# Patient Record
Sex: FEMALE | Race: BLACK OR AFRICAN AMERICAN | NOT HISPANIC OR LATINO | Employment: FULL TIME | URBAN - METROPOLITAN AREA
[De-identification: names, ages, dates, MRNs, and addresses within clinical notes are randomized per-mention and may not be internally consistent; named-entity substitution may affect disease eponyms.]

---

## 2017-09-30 ENCOUNTER — HOSPITAL ENCOUNTER (EMERGENCY)
Facility: HOSPITAL | Age: 22
Discharge: HOME | End: 2017-09-30
Payer: COMMERCIAL

## 2017-09-30 ENCOUNTER — APPOINTMENT (EMERGENCY)
Dept: RADIOLOGY | Facility: HOSPITAL | Age: 22
End: 2017-09-30
Payer: COMMERCIAL

## 2017-09-30 VITALS
RESPIRATION RATE: 18 BRPM | OXYGEN SATURATION: 98 % | TEMPERATURE: 97.9 F | HEART RATE: 93 BPM | DIASTOLIC BLOOD PRESSURE: 62 MMHG | SYSTOLIC BLOOD PRESSURE: 100 MMHG | WEIGHT: 180 LBS

## 2017-09-30 DIAGNOSIS — R07.89 MUSCULAR CHEST PAIN: ICD-10-CM

## 2017-09-30 DIAGNOSIS — R07.89 CHEST PAIN, NON-CARDIAC: Primary | ICD-10-CM

## 2017-09-30 PROCEDURE — 71020 HB CHEST X-RAY 2VW FRONTAL&LATL: CPT

## 2017-09-30 PROCEDURE — 99285 EMERGENCY DEPT VISIT HI MDM: CPT

## 2017-09-30 PROCEDURE — 93005 ELECTROCARDIOGRAM TRACING: CPT

## 2017-09-30 RX ORDER — ACETAMINOPHEN 325 MG/1
650 TABLET ORAL AS NEEDED
COMMUNITY
End: 2018-08-07

## 2017-09-30 RX ORDER — METHOCARBAMOL 500 MG/1
500 TABLET, FILM COATED ORAL AS NEEDED
COMMUNITY
End: 2018-08-07

## 2017-09-30 RX ORDER — NAPROXEN 500 MG/1
250 TABLET ORAL AS NEEDED
COMMUNITY
End: 2018-08-07

## 2017-09-30 NOTE — DISCHARGE INSTRUCTIONS
- Take ibuprofen 400 mg as needed every 6 -8 hours for pain  Chest Wall Pain, Ambulatory Care   GENERAL INFORMATION:   Chest wall pain  may be caused by problems with the muscles, cartilage, or bones of the chest wall  Chest wall pain may also be caused by pain that spreads to your chest from another part of your body  The pain may be aching, severe, dull, or sharp  It may come and go, or it may be constant  The pain may be worse when you move in certain ways, breathe deeply, or cough  Seek immediate care for the following symptoms:   · Severe pain    · You have any of the following signs of a heart attack:      ¨ Squeezing, pressure, or pain in your chest that lasts longer than 5 minutes or returns    ¨ Discomfort or pain in your back, neck, jaw, stomach, or arm     ¨ Trouble breathing    ¨ Nausea or vomiting    ¨ Lightheadedness or a sudden cold sweat, especially with chest pain or trouble breathing  Treatment  depends on the cause of your chest wall pain  You may need any of the following:  · NSAIDs  help decrease swelling and pain or fever  This medicine is available with or without a doctor's order  NSAIDs can cause stomach bleeding or kidney problems in certain people  If you take blood thinner medicine, always ask your healthcare provider if NSAIDs are safe for you  Always read the medicine label and follow directions  · Acetaminophen  decreases pain  It is available without a doctor's order  Ask how much to take and how often to take it  Follow directions  Acetaminophen can cause liver damage if not taken correctly  · Apply heat  on your chest for 20 to 30 minutes every 2 hours for as many days as directed  Heat helps decrease pain and muscle spasms  · Apply ice  on your chest for 15 to 20 minutes every hour or as directed  Use an ice pack, or put crushed ice in a plastic bag  Cover it with a towel  Ice helps prevent tissue damage and decreases swelling and pain    Follow up with your healthcare provider as directed:  Write down your questions so you remember to ask them during your visits  CARE AGREEMENT:   You have the right to help plan your care  Learn about your health condition and how it may be treated  Discuss treatment options with your caregivers to decide what care you want to receive  You always have the right to refuse treatment  The above information is an  only  It is not intended as medical advice for individual conditions or treatments  Talk to your doctor, nurse or pharmacist before following any medical regimen to see if it is safe and effective for you  © 2014 [address] is for End User's use only and may not be sold, redistributed or otherwise used for commercial purposes  All illustrations and images included in CareNotes® are the copyrighted property of [employer] M [employer]  or Sharath Comer
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
## 2018-05-17 LAB
BILIRUB UR QL STRIP: NEGATIVE MG/DL
CLARITY UR: CLEAR
COLOR UR: YELLOW
COMMENT (HISTORICAL): ABNORMAL
GLUCOSE UR STRIP-MCNC: NEGATIVE MG/DL
HGB UR QL STRIP.AUTO: NEGATIVE
KETONES UR STRIP-MCNC: NEGATIVE MG/DL
LEUKOCYTE ESTERASE UR QL STRIP: 25
NITRITE UR QL STRIP: NEGATIVE
PH UR STRIP.AUTO: 5 [PH] (ref 4.5–8)
PREGNANCY TEST URINE (HISTORICAL): NEGATIVE
PROT UR STRIP-MCNC: NEGATIVE MG/DL
SP GR UR STRIP.AUTO: 1.02 (ref 1–1.04)
UROBILINOGEN UR QL STRIP.AUTO: NEGATIVE MG/DL (ref 0–1)

## 2018-08-07 ENCOUNTER — APPOINTMENT (EMERGENCY)
Dept: ULTRASOUND IMAGING | Facility: HOSPITAL | Age: 23
End: 2018-08-07
Payer: COMMERCIAL

## 2018-08-07 ENCOUNTER — HOSPITAL ENCOUNTER (EMERGENCY)
Facility: HOSPITAL | Age: 23
Discharge: HOME | End: 2018-08-07
Attending: EMERGENCY MEDICINE
Payer: COMMERCIAL

## 2018-08-07 VITALS
WEIGHT: 190 LBS | SYSTOLIC BLOOD PRESSURE: 111 MMHG | HEART RATE: 80 BPM | RESPIRATION RATE: 16 BRPM | OXYGEN SATURATION: 100 % | DIASTOLIC BLOOD PRESSURE: 74 MMHG | BODY MASS INDEX: 31.14 KG/M2 | TEMPERATURE: 98.3 F

## 2018-08-07 DIAGNOSIS — Z34.90 PREGNANCY AT EARLY STAGE: Primary | ICD-10-CM

## 2018-08-07 DIAGNOSIS — R19.7 NAUSEA VOMITING AND DIARRHEA: ICD-10-CM

## 2018-08-07 DIAGNOSIS — R11.2 NAUSEA VOMITING AND DIARRHEA: ICD-10-CM

## 2018-08-07 LAB
ABO GROUP BLD: NORMAL
B-HCG SERPL-ACNC: ABNORMAL MIU/ML
BACTERIA UR QL AUTO: ABNORMAL /HPF
BILIRUB UR QL STRIP: NEGATIVE
BLD GP AB SCN SERPL QL: NEGATIVE
CLARITY UR: CLEAR
COLOR UR: YELLOW
EXT PREG TEST URINE: ABNORMAL
GLUCOSE UR STRIP-MCNC: NEGATIVE MG/DL
HGB UR QL STRIP.AUTO: NEGATIVE
KETONES UR STRIP-MCNC: NEGATIVE MG/DL
LEUKOCYTE ESTERASE UR QL STRIP: ABNORMAL
NITRITE UR QL STRIP: NEGATIVE
NON-SQ EPI CELLS URNS QL MICRO: ABNORMAL /HPF
PH UR STRIP.AUTO: 6.5 [PH] (ref 4.5–8)
PROT UR STRIP-MCNC: NEGATIVE MG/DL
RBC #/AREA URNS AUTO: ABNORMAL /HPF
RH BLD: POSITIVE
SP GR UR STRIP.AUTO: >=1.03 (ref 1–1.03)
SPECIMEN EXPIRATION DATE: NORMAL
UROBILINOGEN UR QL STRIP.AUTO: 1 E.U./DL
WBC #/AREA URNS AUTO: ABNORMAL /HPF

## 2018-08-07 PROCEDURE — 76801 OB US < 14 WKS SINGLE FETUS: CPT

## 2018-08-07 PROCEDURE — 81001 URINALYSIS AUTO W/SCOPE: CPT

## 2018-08-07 PROCEDURE — 81025 URINE PREGNANCY TEST: CPT | Performed by: EMERGENCY MEDICINE

## 2018-08-07 PROCEDURE — 86900 BLOOD TYPING SEROLOGIC ABO: CPT | Performed by: EMERGENCY MEDICINE

## 2018-08-07 PROCEDURE — 86901 BLOOD TYPING SEROLOGIC RH(D): CPT | Performed by: EMERGENCY MEDICINE

## 2018-08-07 PROCEDURE — 86850 RBC ANTIBODY SCREEN: CPT | Performed by: EMERGENCY MEDICINE

## 2018-08-07 PROCEDURE — 99284 EMERGENCY DEPT VISIT MOD MDM: CPT

## 2018-08-07 PROCEDURE — 84702 CHORIONIC GONADOTROPIN TEST: CPT | Performed by: EMERGENCY MEDICINE

## 2018-08-07 PROCEDURE — 36415 COLL VENOUS BLD VENIPUNCTURE: CPT | Performed by: EMERGENCY MEDICINE

## 2018-08-07 RX ORDER — ONDANSETRON 4 MG/1
4 TABLET, ORALLY DISINTEGRATING ORAL EVERY 8 HOURS PRN
Qty: 20 TABLET | Refills: 0 | Status: SHIPPED | OUTPATIENT
Start: 2018-08-07 | End: 2018-08-07

## 2018-08-07 RX ORDER — ONDANSETRON 4 MG/1
4 TABLET, ORALLY DISINTEGRATING ORAL EVERY 6 HOURS PRN
Status: DISCONTINUED | OUTPATIENT
Start: 2018-08-07 | End: 2018-08-07 | Stop reason: HOSPADM

## 2018-08-07 RX ORDER — ONDANSETRON 4 MG/1
4 TABLET, ORALLY DISINTEGRATING ORAL EVERY 8 HOURS PRN
Qty: 20 TABLET | Refills: 0 | Status: SHIPPED | OUTPATIENT
Start: 2018-08-07 | End: 2018-08-15 | Stop reason: SDUPTHER

## 2018-08-07 RX ADMIN — ONDANSETRON 4 MG: 4 TABLET, ORALLY DISINTEGRATING ORAL at 09:42

## 2018-08-07 NOTE — DISCHARGE INSTRUCTIONS
Pregnancy   WHAT YOU NEED TO KNOW:   A normal pregnancy lasts about 40 weeks  The first trimester lasts from your last period through the 12th week of pregnancy  The second trimester lasts from the 13th week of your pregnancy through the 23rd week  The third trimester lasts from your 24th week of pregnancy until your baby is born  If you know the date of your last period, your healthcare provider can estimate your due date  You may give birth to your baby any time from 37 weeks to 2 weeks after your due date  DISCHARGE INSTRUCTIONS:   Return to the emergency department if:   · You develop a severe headache that does not go away  · You have new or increased vision changes, such as blurred or spotted vision  · You have new or increased swelling in your face or hands  · You have pain or cramping in your abdomen or low back  · You have vaginal bleeding  Contact your healthcare provider or obstetrician if:   · You have abdominal cramps, pressure, or tightening  · You have a change in vaginal discharge  · You cannot keep food or drinks down, and you are losing weight  · You have chills or a fever  · You have vaginal itching, burning, or pain  · You have yellow, green, white, or foul-smelling vaginal discharge  · You have pain or burning when you urinate, less urine than usual, or pink or bloody urine  · You have questions or concerns about your condition or care  Medicines:   · Prenatal vitamins  provide some of the extra vitamins and minerals you need during pregnancy  Prenatal vitamins may also help to decrease the risk of certain birth defects  · Take your medicine as directed  Contact your healthcare provider if you think your medicine is not helping or if you have side effects  Tell him or her if you are allergic to any medicine  Keep a list of the medicines, vitamins, and herbs you take  Include the amounts, and when and why you take them   Bring the list or the pill bottles to follow-up visits  Carry your medicine list with you in case of an emergency  Follow up with your healthcare provider or obstetrician as directed:  Go to all of your prenatal visits during your pregnancy  Write down your questions so you remember to ask them during your visits  Body changes that may occur during your pregnancy:   · Breast changes  you will experience include tenderness and tingling during the early part of your pregnancy  Your breasts will become larger  You may need to use a support bra  You may see a thin, yellow fluid, called colostrum, leak from your nipples during the second trimester  Colostrum is a liquid that changes to milk about 3 days after you give birth  · Skin changes and stretch marks  may occur during your pregnancy  You may have red marks, called stretch marks, on your skin  Stretch marks will usually fade after pregnancy  Use lotion if your skin is dry and itchy  The skin on your face, around your nipples, and below your belly button may darken  Most of the time, your skin will return to its normal color after your baby is born  · Morning sickness  is nausea and vomiting that can happen at any time of day  Avoid fatty and spicy foods  Eat small meals throughout the day instead of large meals  Haylie may help to decrease nausea  Ask your healthcare provider about other ways of decreasing nausea and vomiting  · Heartburn  may be caused by changes in your hormones during pregnancy  Your growing uterus may also push your stomach upward and force stomach acid to back up into your esophagus  Eat 4 or 5 small meals each day instead of large meals  Avoid spicy foods  Avoid eating right before bedtime  · Constipation  may develop during your pregnancy  To treat constipation, eat foods high in fiber such as fiber cereals, beans, fruits, vegetables, whole-grain breads, and prune juice  Get regular exercise and drink plenty of water   Your healthcare provider may also suggest a fiber supplement to soften your bowel movements  Talk to your healthcare provider before you use any medicines to decrease constipation  · Hemorrhoids  are enlarged veins in the rectal area  They may cause pain, itching, and bright red bleeding from your rectum  To decrease your risk of hemorrhoids, prevent constipation and do not strain to have a bowel movement  If you have hemorrhoids, soak in a tub of warm water to ease discomfort  Ask your healthcare provider how you can treat hemorrhoids  · Leg cramps and swelling  may be caused by low calcium levels or the added weight of pregnancy  Raise your legs above the level of your heart to decrease swelling  During a leg cramp, stretch or massage the muscle that has the cramp  Heat may help decrease pain and muscle spasms  Apply heat on your muscle for 20 to 30 minutes every 2 hours for as many days as directed  · Back pain  may occur as your baby grows  Do not stand for long periods of time or lift heavy items  Use good posture while you stand, squat, or bend  Wear low-heeled shoes with good support  Rest may also help to relieve back pain  Ask your healthcare provider about exercises you can do to strengthen your back muscles  Stay healthy during your pregnancy:   · Eat a variety of healthy foods  Healthy foods include fruits, vegetables, whole-grain breads, low-fat dairy foods, beans, lean meats, and fish  Drink liquids as directed  Ask how much liquid to drink each day and which liquids are best for you  Limit caffeine to less than 200 milligrams each day  Limit your intake of fish to 2 servings each week  Choose fish low in mercury such as canned light tuna, shrimp, crab, salmon, cod, or tilapia  Do not  eat fish high in mercury such as swordfish, tilefish, raeann mackerel, and shark  · Take prenatal vitamins as directed  Your need for certain vitamins and minerals, such as folic acid, increases during pregnancy   Prenatal vitamins provide some of the extra vitamins and minerals you need  Prenatal vitamins may also help to decrease the risk of certain birth defects  · Ask how much weight you should gain during your pregnancy  Too much or too little weight gain can be unhealthy for you and your baby  · Talk to your healthcare provider about exercise  Moderate exercise can help you stay fit  Your healthcare provider will help you plan an exercise program that is safe for you during pregnancy  · Do not smoke  If you smoke, it is never too late to quit  Smoking increases your risk of a miscarriage and other health problems during your pregnancy  Smoking can cause your baby to be born too early or weigh less at birth  Ask your healthcare provider for information if you need help quitting  · Do not drink alcohol  Alcohol passes from your body to your baby through the placenta  It can affect your baby's brain development and cause fetal alcohol syndrome (FAS)  FAS is a group of conditions that causes mental, behavior, and growth problems  · Talk to your healthcare provider before you take any medicines  Many medicines may harm your baby if you take them when you are pregnant  Do not take any medicines, vitamins, herbs, or supplements without first talking to your healthcare provider  Never use illegal or street drugs (such as marijuana or cocaine) while you are pregnant  Safety tips:   · Avoid hot tubs and saunas  Do not use a hot tub or sauna while you are pregnant, especially during your first trimester  Hot tubs and saunas may raise your baby's temperature and increase the risk of birth defects  · Avoid toxoplasmosis  This is an infection caused by eating raw meat or being around infected cat feces  It can cause birth defects, miscarriages, and other problems  Wash your hands after you touch raw meat  Make sure any meat is well-cooked before you eat it  Avoid raw eggs and unpasteurized milk   Use gloves or ask someone else to clean your cat's litter box while you are pregnant  · Ask your healthcare provider about travel  The most comfortable time to travel is during the second trimester  Ask your healthcare provider if you can travel after 36 weeks  You may not be able to travel in an airplane after 36 weeks  He may also recommend that you avoid long road trips  © 2017 [address] Information is for End User's use only and may not be sold, redistributed or otherwise used for commercial purposes  All illustrations and images included in CareNotes® are the copyrighted property of [employer]  or Sharath Comer  The above information is an  only  It is not intended as medical advice for individual conditions or treatments  Talk to your doctor, nurse or pharmacist before following any medical regimen to see if it is safe and effective for you

## 2018-08-07 NOTE — ED PROVIDER NOTES
History  Chief Complaint   Patient presents with    Diarrhea     Reports for last two weeks, whenever she eats she has diarrhea  Just found out she is pregnant  Also has been vomiting  20-year-old female presents for nausea vomiting diarrhea  Recently found out last month that she was pregnant  For the past two weeks she has had watery diarrhea with no blood  Vomiting without bilious contents or blood  Decreased appetite  symptoms seem to be worse in the morning  Patient does have some crampy abdominal pain intermittently  No vaginal bleeding or discharge No dysuria  No other associated symptoms  No prenatal care thus far  Assessment plan:  Nausea vomiting diarrhea  Likely related to pregnancy  Check urine beta hCG  Will require ultrasound to confirm IUP  Zofran for nausea            None       History reviewed  No pertinent past medical history  History reviewed  No pertinent surgical history  History reviewed  No pertinent family history  I have reviewed and agree with the history as documented  Social History   Substance Use Topics    Smoking status: Never Smoker    Smokeless tobacco: Never Used    Alcohol use No        Review of Systems   Constitutional: Negative for chills, fatigue and fever  Eyes: Negative for photophobia and visual disturbance  Respiratory: Negative for cough and shortness of breath  Cardiovascular: Negative for chest pain, palpitations and leg swelling  Gastrointestinal: Positive for abdominal pain, diarrhea and nausea  Negative for vomiting  Endocrine: Negative for polydipsia and polyuria  Genitourinary: Negative for decreased urine volume, difficulty urinating, dysuria and frequency  Musculoskeletal: Negative for back pain, neck pain and neck stiffness  Skin: Negative for color change and rash  Allergic/Immunologic: Negative for environmental allergies and immunocompromised state     Neurological: Negative for dizziness and headaches  Hematological: Negative for adenopathy  Does not bruise/bleed easily  Psychiatric/Behavioral: Negative for dysphoric mood  The patient is not nervous/anxious  Physical Exam  Physical Exam   Constitutional: She is oriented to person, place, and time  She appears well-developed and well-nourished  No distress  HENT:   Head: Normocephalic and atraumatic  Nose: Nose normal    Eyes: Conjunctivae and EOM are normal  Pupils are equal, round, and reactive to light  No scleral icterus  Neck: Normal range of motion  Neck supple  No JVD present  No tracheal deviation present  No thyromegaly present  Cardiovascular: Normal rate, regular rhythm, normal heart sounds and intact distal pulses  Exam reveals no gallop and no friction rub  Pulmonary/Chest: Effort normal and breath sounds normal  No respiratory distress  She has no wheezes  She has no rales  She exhibits no tenderness  Abdominal: Soft  Bowel sounds are normal  She exhibits no distension and no mass  There is tenderness (suprapubic)  There is no rebound and no guarding  No hernia  Musculoskeletal: Normal range of motion  She exhibits no edema, tenderness or deformity  Neurological: She is alert and oriented to person, place, and time  She has normal reflexes  No cranial nerve deficit  Coordination normal    Skin: Skin is warm and dry  She is not diaphoretic  No erythema  Psychiatric: She has a normal mood and affect  Her behavior is normal    Nursing note and vitals reviewed        Vital Signs  ED Triage Vitals [08/07/18 0928]   Temperature Pulse Respirations Blood Pressure SpO2   98 3 °F (36 8 °C) 94 16 113/66 98 %      Temp Source Heart Rate Source Patient Position - Orthostatic VS BP Location FiO2 (%)   Oral Monitor Sitting Right arm --      Pain Score       7           Vitals:    08/07/18 0928 08/07/18 1119   BP: 113/66 111/74   Pulse: 94 80   Patient Position - Orthostatic VS: Sitting Sitting       Visual Acuity      ED Medications  Medications - No data to display    Diagnostic Studies  Results Reviewed     Procedure Component Value Units Date/Time    Quantitative hCG [[id number]]  (Abnormal) Collected:  08/07/18 0939    Lab Status:  Final result Specimen:  Blood from Arm, Left Updated:  08/07/18 1057     HCG, Quant 66,725 1 (H) mIU/mL     Narrative:          Expected Ranges:     Approximate               Approximate HCG  Gestation age          Concentration ( mIU/mL)  _____________          ______________________   Jalyn Pandya                      HCG values  0 2-1                       5-50  1-2                           2-3                         100-5000  3-4                         500-59037  4-5                         1000-94256  5-6                         [id number]  6-8                         [id number]  8-12                        [id number]    Urine Microscopic [[id number]]  (Abnormal) Collected:  08/07/18 1013    Lab Status:  Final result Specimen:  Urine from Urine, Clean Catch Updated:  08/07/18 1042     RBC, UA None Seen /hpf      WBC, UA 1-2 (A) /hpf      Epithelial Cells Occasional /hpf      Bacteria, UA Occasional /hpf     POCT pregnancy, urine [[id number]]  (Abnormal) Resulted:  08/07/18 1007    Lab Status:  Final result Updated:  08/07/18 1007     EXT PREG TEST UR (Ref: Negative) POSS ( + )    POCT urinalysis dipstick [[id number]]  (Abnormal) Resulted:  08/07/18 1007    Lab Status:  Final result Specimen:  Urine Updated:  08/07/18 1007    ED Urine Macroscopic [[id number]]  (Abnormal) Collected:  08/07/18 1013    Lab Status:  Final result Specimen:  Urine Updated:  08/07/18 1005     Color, UA Yellow     Clarity, UA Clear     pH, UA 6 5     Leukocytes, UA Trace (A)     Nitrite, UA Negative     Protein, UA Negative mg/dl      Glucose, UA Negative mg/dl      Ketones, UA Negative mg/dl      Urobilinogen, UA 1 0 E U /dl      Bilirubin, UA Negative     Blood, UA Negative     Specific Gravity, UA >=1 030    Narrative: CLINITEK RESULT                 US OB < 14 weeks with transvaginal   Final Result by Srinath Posey DO (08/07 1129)   Single live intrauterine gestation at 7 weeks 2 days (range +/- 4 days)  MÓNICA of March 24, 2019  Workstation performed: [id number]                    Procedures  Procedures       Phone Contacts  ED Phone Contact    ED Course                               MDM  Number of Diagnoses or Management Options  Nausea vomiting and diarrhea: new and requires workup  Pregnancy at early stage: new and requires workup  Diagnosis management comments: IUP with subchroinic hemorrhage  Pt has no bleeding vaginally, has appt with obgyn next week  Amount and/or Complexity of Data Reviewed  Clinical lab tests: reviewed and ordered  Tests in the radiology section of CPT®: ordered and reviewed  Tests in the medicine section of CPT®: reviewed and ordered  Independent visualization of images, tracings, or specimens: yes      CritCare Time    Disposition  Final diagnoses:   Pregnancy at early stage   Nausea vomiting and diarrhea     Time reflects when diagnosis was documented in both MDM as applicable and the Disposition within this note     Time User Action Codes Description Comment    8/7/2018 11:34 AM Alea Mcdowell Add [Z34 90] Pregnancy at early stage     8/7/2018  1:33 PM Alea Mcdowell Add [R11 2,  R19 7] Nausea vomiting and diarrhea       ED Disposition     ED Disposition Condition Comment    Discharge  Tushar Leblanc discharge to home/self care      Condition at discharge: Good        Follow-up Information     Follow up With Specialties [facility]   [place] [id number] [phone]          Discharge Medication List as of 8/7/2018 11:48 AM      START taking these medications    Details   ondansetron (ZOFRAN-ODT) 4 mg disintegrating tablet Take 1 tablet (4 mg total) by mouth every 8 (eight) hours as needed for nausea or vomiting for up to 7 days, Starting Tue 8/7/2018, Until Tue 8/14/2018, Print           No discharge procedures on file      ED Provider  Electronically Signed by           Kemar Ulloa DO  08/07/18 7537

## 2018-08-15 ENCOUNTER — OFFICE VISIT (OUTPATIENT)
Dept: OBGYN CLINIC | Facility: CLINIC | Age: 23
End: 2018-08-15
Payer: COMMERCIAL

## 2018-08-15 VITALS
BODY MASS INDEX: 30.92 KG/M2 | DIASTOLIC BLOOD PRESSURE: 76 MMHG | HEIGHT: 67 IN | WEIGHT: 197 LBS | SYSTOLIC BLOOD PRESSURE: 117 MMHG

## 2018-08-15 DIAGNOSIS — Z34.90 PREGNANCY AT EARLY STAGE: ICD-10-CM

## 2018-08-15 DIAGNOSIS — Z33.1 ENCOUNTER FOR INCIDENTAL PREGNANCY: Primary | ICD-10-CM

## 2018-08-15 DIAGNOSIS — N91.2 AMENORRHEA: ICD-10-CM

## 2018-08-15 LAB — SL AMB POCT URINE HCG: POSITIVE

## 2018-08-15 PROCEDURE — 81025 URINE PREGNANCY TEST: CPT | Performed by: OBSTETRICS & GYNECOLOGY

## 2018-08-15 PROCEDURE — 99203 OFFICE O/P NEW LOW 30 MIN: CPT | Performed by: OBSTETRICS & GYNECOLOGY

## 2018-08-15 RX ORDER — ONDANSETRON 4 MG/1
4 TABLET, ORALLY DISINTEGRATING ORAL EVERY 6 HOURS SCHEDULED
Qty: 20 TABLET | Refills: 3 | Status: SHIPPED | OUTPATIENT
Start: 2018-08-15 | End: 2018-10-31

## 2018-08-15 NOTE — PROGRESS NOTES
Assessment/Plan:   Here for pregnancy confirmation   Diagnoses and all orders for this visit:    Encounter for incidental pregnancy  -     Obstetric panel; Future  -     hCG, quantitative; Future  -     TSH, 3rd generation with Free T4 reflex; Future  -     US OB < 14 weeks single or first gestation level 1; Future  -     Progesterone; Future    Amenorrhea  -     POCT urine HCG    Pregnancy at early stage  -     ondansetron (ZOFRAN-ODT) 4 mg disintegrating tablet; Take 1 tablet (4 mg total) by mouth every 6 (six) hours for 7 days        Subjective:  Pregnancy confirmation     Patient ID: Leeanne Howard is a 25 y o  female  HPI   60-year-old female  4 para 2012 positive home pregnancy test confirmed added emergency room visit for diarrhea last week FD LMP was 2018 estimated gestational age is approximately 8 weeks and 4 days EDC is 2019  Patient does have some ongoing nausea which she uses Zofran  She has also had some loose stools and occasional diarrhea  Recommended Imodium  Her 1st baby delivered vaginal when she was 13years old, he weighed 9 lb  Her 2nd baby was a female which weighed 8 lb  She is approximately 8 weeks today  Screening laboratory studies prenatal panel will be drawn follow-up ultrasound has been ordered will schedule nurse interview and physical exam within the next 2 weeks  Patient oriented to office procedures and practices all questions answered  Review of Systems   All other systems reviewed and are negative  Objective:  No acute distress     Physical Exam   Constitutional: She is oriented to person, place, and time  She appears well-developed and well-nourished  HENT:   Head: Normocephalic  Eyes: Pupils are equal, round, and reactive to light  Neck: Normal range of motion  Genitourinary: No vaginal discharge found  Genitourinary Comments: Pelvic exam deferred   Neurological: She is alert and oriented to person, place, and time     Skin: Skin is warm and dry  Psychiatric: She has a normal mood and affect   Her behavior is normal  Judgment and thought content normal

## 2018-08-23 ENCOUNTER — INITIAL PRENATAL (OUTPATIENT)
Dept: OBGYN CLINIC | Facility: CLINIC | Age: 23
End: 2018-08-23
Payer: COMMERCIAL

## 2018-08-23 ENCOUNTER — ULTRASOUND (OUTPATIENT)
Dept: OBGYN CLINIC | Facility: CLINIC | Age: 23
End: 2018-08-23
Payer: COMMERCIAL

## 2018-08-23 DIAGNOSIS — Z36.9 ANTENATAL SCREENING ENCOUNTER: Primary | ICD-10-CM

## 2018-08-23 DIAGNOSIS — Z3A.09 9 WEEKS GESTATION OF PREGNANCY: Primary | ICD-10-CM

## 2018-08-23 LAB
ABO GROUP BLD: NORMAL
BACTERIA UR QL AUTO: ABNORMAL /HPF
BASOPHILS # BLD AUTO: 0.04 THOUSANDS/ΜL (ref 0–0.1)
BASOPHILS NFR BLD AUTO: 0 % (ref 0–1)
BILIRUB UR QL STRIP: NEGATIVE
BLD GP AB SCN SERPL QL: NEGATIVE
CLARITY UR: CLEAR
COLOR UR: YELLOW
EOSINOPHIL # BLD AUTO: 0.06 THOUSAND/ΜL (ref 0–0.61)
EOSINOPHIL NFR BLD AUTO: 1 % (ref 0–6)
ERYTHROCYTE [DISTWIDTH] IN BLOOD BY AUTOMATED COUNT: 14.5 % (ref 11.6–15.1)
GLUCOSE UR STRIP-MCNC: NEGATIVE MG/DL
HCT VFR BLD AUTO: 41.2 % (ref 34.8–46.1)
HGB BLD-MCNC: 12.9 G/DL (ref 11.5–15.4)
HGB UR QL STRIP.AUTO: NEGATIVE
HYALINE CASTS #/AREA URNS LPF: ABNORMAL /LPF
IMM GRANULOCYTES # BLD AUTO: 0.03 THOUSAND/UL (ref 0–0.2)
IMM GRANULOCYTES NFR BLD AUTO: 0 % (ref 0–2)
KETONES UR STRIP-MCNC: NEGATIVE MG/DL
LEUKOCYTE ESTERASE UR QL STRIP: NEGATIVE
LYMPHOCYTES # BLD AUTO: 1.71 THOUSANDS/ΜL (ref 0.6–4.47)
LYMPHOCYTES NFR BLD AUTO: 19 % (ref 14–44)
MCH RBC QN AUTO: 26.1 PG (ref 26.8–34.3)
MCHC RBC AUTO-ENTMCNC: 31.3 G/DL (ref 31.4–37.4)
MCV RBC AUTO: 83 FL (ref 82–98)
MONOCYTES # BLD AUTO: 0.59 THOUSAND/ΜL (ref 0.17–1.22)
MONOCYTES NFR BLD AUTO: 7 % (ref 4–12)
NEUTROPHILS # BLD AUTO: 6.66 THOUSANDS/ΜL (ref 1.85–7.62)
NEUTS SEG NFR BLD AUTO: 73 % (ref 43–75)
NITRITE UR QL STRIP: NEGATIVE
NON-SQ EPI CELLS URNS QL MICRO: ABNORMAL /HPF
NRBC BLD AUTO-RTO: 0 /100 WBCS
PH UR STRIP.AUTO: 7.5 [PH] (ref 4.5–8)
PLATELET # BLD AUTO: 357 THOUSANDS/UL (ref 149–390)
PMV BLD AUTO: 10.9 FL (ref 8.9–12.7)
PROT UR STRIP-MCNC: ABNORMAL MG/DL
RBC # BLD AUTO: 4.94 MILLION/UL (ref 3.81–5.12)
RBC #/AREA URNS AUTO: ABNORMAL /HPF
RH BLD: POSITIVE
SP GR UR STRIP.AUTO: 1.02 (ref 1–1.03)
SPECIMEN EXPIRATION DATE: NORMAL
UROBILINOGEN UR QL STRIP.AUTO: 1 E.U./DL
WBC # BLD AUTO: 9.09 THOUSAND/UL (ref 4.31–10.16)
WBC #/AREA URNS AUTO: ABNORMAL /HPF

## 2018-08-23 PROCEDURE — OBC

## 2018-08-23 PROCEDURE — 80081 OBSTETRIC PANEL INC HIV TSTG: CPT | Performed by: OBSTETRICS & GYNECOLOGY

## 2018-08-23 PROCEDURE — 76817 TRANSVAGINAL US OBSTETRIC: CPT

## 2018-08-23 PROCEDURE — 36415 COLL VENOUS BLD VENIPUNCTURE: CPT

## 2018-08-23 PROCEDURE — 87086 URINE CULTURE/COLONY COUNT: CPT | Performed by: OBSTETRICS & GYNECOLOGY

## 2018-08-23 PROCEDURE — 81001 URINALYSIS AUTO W/SCOPE: CPT

## 2018-08-23 NOTE — PROGRESS NOTES
New OB counseling, Baby and Me reviewed, Pregnancy Essentials booklet given  Pt had prenatal labs drawn in office today  Pt was given referral for MFM and appointments were made  Pt denies any problems or concerns at this time

## 2018-08-24 LAB
BACTERIA UR CULT: NORMAL
HBV SURFACE AG SER QL: NORMAL
RPR SER QL: NORMAL
RUBV IGG SERPL IA-ACNC: 58.3 IU/ML

## 2018-08-25 LAB — HIV 1+2 AB+HIV1 P24 AG SERPL QL IA: NORMAL

## 2018-09-05 ENCOUNTER — TELEPHONE (OUTPATIENT)
Dept: OBGYN CLINIC | Facility: CLINIC | Age: 23
End: 2018-09-05

## 2018-09-05 NOTE — TELEPHONE ENCOUNTER
----- Message from Lida Soliman MD sent at 9/4/2018  7:53 AM EDT -----  Regarding: No show  Please call pt to reschedule appt

## 2018-09-10 ENCOUNTER — ROUTINE PRENATAL (OUTPATIENT)
Dept: PERINATAL CARE | Facility: CLINIC | Age: 23
End: 2018-09-10
Payer: COMMERCIAL

## 2018-09-10 VITALS — DIASTOLIC BLOOD PRESSURE: 51 MMHG | HEART RATE: 90 BPM | SYSTOLIC BLOOD PRESSURE: 86 MMHG | HEIGHT: 67 IN

## 2018-09-10 DIAGNOSIS — Z36.82 ENCOUNTER FOR NUCHAL TRANSLUCENCY TESTING: Primary | ICD-10-CM

## 2018-09-10 DIAGNOSIS — Z36.9 ANTENATAL SCREENING ENCOUNTER: ICD-10-CM

## 2018-09-10 DIAGNOSIS — Z3A.12 12 WEEKS GESTATION OF PREGNANCY: ICD-10-CM

## 2018-09-10 PROCEDURE — 76813 OB US NUCHAL MEAS 1 GEST: CPT | Performed by: OBSTETRICS & GYNECOLOGY

## 2018-09-10 PROCEDURE — 99201 PR OFFICE OUTPATIENT NEW 10 MINUTES: CPT | Performed by: OBSTETRICS & GYNECOLOGY

## 2018-10-08 ENCOUNTER — TELEPHONE (OUTPATIENT)
Dept: PERINATAL CARE | Facility: CLINIC | Age: 23
End: 2018-10-08

## 2018-10-31 ENCOUNTER — ROUTINE PRENATAL (OUTPATIENT)
Dept: OBGYN CLINIC | Facility: CLINIC | Age: 23
End: 2018-10-31
Payer: COMMERCIAL

## 2018-10-31 VITALS
SYSTOLIC BLOOD PRESSURE: 90 MMHG | HEIGHT: 67 IN | WEIGHT: 203 LBS | DIASTOLIC BLOOD PRESSURE: 58 MMHG | BODY MASS INDEX: 31.86 KG/M2 | HEART RATE: 83 BPM

## 2018-10-31 DIAGNOSIS — N76.0 BV (BACTERIAL VAGINOSIS): ICD-10-CM

## 2018-10-31 DIAGNOSIS — N89.8 VAGINAL ITCHING: ICD-10-CM

## 2018-10-31 DIAGNOSIS — Z3A.19 19 WEEKS GESTATION OF PREGNANCY: ICD-10-CM

## 2018-10-31 DIAGNOSIS — B96.89 BV (BACTERIAL VAGINOSIS): ICD-10-CM

## 2018-10-31 DIAGNOSIS — Z11.3 SCREENING FOR STD (SEXUALLY TRANSMITTED DISEASE): ICD-10-CM

## 2018-10-31 DIAGNOSIS — N30.90 CYSTITIS: Primary | ICD-10-CM

## 2018-10-31 DIAGNOSIS — Z12.4 SCREENING FOR CERVICAL CANCER: ICD-10-CM

## 2018-10-31 DIAGNOSIS — Z11.51 SCREENING FOR HPV (HUMAN PAPILLOMAVIRUS): ICD-10-CM

## 2018-10-31 PROCEDURE — 57150 TREAT VAGINA INFECTION: CPT | Performed by: OBSTETRICS & GYNECOLOGY

## 2018-10-31 PROCEDURE — 81001 URINALYSIS AUTO W/SCOPE: CPT | Performed by: OBSTETRICS & GYNECOLOGY

## 2018-10-31 PROCEDURE — 87591 N.GONORRHOEAE DNA AMP PROB: CPT | Performed by: OBSTETRICS & GYNECOLOGY

## 2018-10-31 PROCEDURE — 87491 CHLMYD TRACH DNA AMP PROBE: CPT | Performed by: OBSTETRICS & GYNECOLOGY

## 2018-10-31 PROCEDURE — 87086 URINE CULTURE/COLONY COUNT: CPT | Performed by: OBSTETRICS & GYNECOLOGY

## 2018-10-31 PROCEDURE — G0143 SCR C/V CYTO,THINLAYER,RESCR: HCPCS | Performed by: OBSTETRICS & GYNECOLOGY

## 2018-10-31 PROCEDURE — 99214 OFFICE O/P EST MOD 30 MIN: CPT | Performed by: OBSTETRICS & GYNECOLOGY

## 2018-10-31 RX ORDER — CLOTRIMAZOLE AND BETAMETHASONE DIPROPIONATE 10; .64 MG/G; MG/G
CREAM TOPICAL 2 TIMES DAILY
Qty: 30 G | Refills: 0 | Status: ON HOLD | OUTPATIENT
Start: 2018-10-31 | End: 2019-03-27 | Stop reason: ALTCHOICE

## 2018-10-31 RX ORDER — METRONIDAZOLE 500 MG/1
500 TABLET ORAL EVERY 12 HOURS SCHEDULED
Qty: 14 TABLET | Refills: 0 | Status: SHIPPED | OUTPATIENT
Start: 2018-10-31 | End: 2018-11-07

## 2018-10-31 NOTE — PROGRESS NOTES
IUP at 19 weeks and 4 days, patient reports positive fetal movement  Denies pelvic pain pressure or leakage of fluid denies contractions  Physical exam completed today  Patient was having vaginal discharge wet mount positive for clue cells  Will treat with metronidazole  She has level 2 ultrasound scheduled next week  Reviewed signs of  labor and kick counts

## 2018-11-01 LAB
BACTERIA UR QL AUTO: ABNORMAL /HPF
BILIRUB UR QL STRIP: NEGATIVE
CLARITY UR: ABNORMAL
COLOR UR: YELLOW
GLUCOSE UR STRIP-MCNC: NEGATIVE MG/DL
HGB UR QL STRIP.AUTO: NEGATIVE
HYALINE CASTS #/AREA URNS LPF: ABNORMAL /LPF
KETONES UR STRIP-MCNC: NEGATIVE MG/DL
LEUKOCYTE ESTERASE UR QL STRIP: ABNORMAL
NITRITE UR QL STRIP: NEGATIVE
NON-SQ EPI CELLS URNS QL MICRO: ABNORMAL /HPF
PH UR STRIP.AUTO: 8 [PH] (ref 4.5–8)
PROT UR STRIP-MCNC: ABNORMAL MG/DL
RBC #/AREA URNS AUTO: ABNORMAL /HPF
SP GR UR STRIP.AUTO: 1.02 (ref 1–1.03)
UROBILINOGEN UR QL STRIP.AUTO: 0.2 E.U./DL
WBC #/AREA URNS AUTO: ABNORMAL /HPF

## 2018-11-02 LAB
BACTERIA UR CULT: NORMAL
CHLAMYDIA DNA CVX QL NAA+PROBE: NORMAL
N GONORRHOEA DNA GENITAL QL NAA+PROBE: NORMAL

## 2018-11-05 ENCOUNTER — ROUTINE PRENATAL (OUTPATIENT)
Dept: PERINATAL CARE | Facility: CLINIC | Age: 23
End: 2018-11-05
Payer: COMMERCIAL

## 2018-11-05 VITALS
HEART RATE: 100 BPM | DIASTOLIC BLOOD PRESSURE: 72 MMHG | SYSTOLIC BLOOD PRESSURE: 110 MMHG | BODY MASS INDEX: 31.55 KG/M2 | HEIGHT: 67 IN | WEIGHT: 201 LBS

## 2018-11-05 DIAGNOSIS — O99.212 OBESITY AFFECTING PREGNANCY IN SECOND TRIMESTER: Primary | ICD-10-CM

## 2018-11-05 DIAGNOSIS — Z36.86 ENCOUNTER FOR ANTENATAL SCREENING FOR CERVICAL LENGTH: ICD-10-CM

## 2018-11-05 DIAGNOSIS — Z3A.20 20 WEEKS GESTATION OF PREGNANCY: ICD-10-CM

## 2018-11-05 DIAGNOSIS — Z36.3 ENCOUNTER FOR ANTENATAL SCREENING FOR MALFORMATIONS: ICD-10-CM

## 2018-11-05 PROCEDURE — 99212 OFFICE O/P EST SF 10 MIN: CPT | Performed by: OBSTETRICS & GYNECOLOGY

## 2018-11-05 PROCEDURE — 76811 OB US DETAILED SNGL FETUS: CPT | Performed by: OBSTETRICS & GYNECOLOGY

## 2018-11-05 PROCEDURE — 76817 TRANSVAGINAL US OBSTETRIC: CPT | Performed by: OBSTETRICS & GYNECOLOGY

## 2018-11-05 NOTE — PATIENT INSTRUCTIONS
Thank you for choosing [facility] for your  care today  If you have any questions about your ultrasound or care, please do not hesitate to contact us or your primary obstetrician  Please consider taking low-dose aspirin 81mg daily for the prevention of preeclampsia beginning now  Please try to keep your weight gain to 11-20 pounds this pregnancy; this is best accomplished by regular aerobic exercise and healthy eating  Please consider getting your influenza vaccine  Pregnant women and children are at higher risk of influenza-related complications  The vaccine, which is never 100% effective, biologically cannot cause influenza and remains the only protection we can offer against influenza which can be fatal   Please aim for vaccination of your entire household and insist that anyone coming into contact with your baby be vaccinated

## 2018-11-05 NOTE — PROGRESS NOTES
A transvaginal ultrasound was performed  Sonographer note on use of High Level Disinfection Process (Trophon) for transvaginal probe# 1 used, serial # [id number]    Omar Camilo RDMS

## 2018-11-06 LAB
LAB AP GYN PRIMARY INTERPRETATION: NORMAL
Lab: NORMAL

## 2018-11-09 ENCOUNTER — TELEPHONE (OUTPATIENT)
Dept: OBGYN CLINIC | Facility: CLINIC | Age: 23
End: 2018-11-09

## 2018-11-09 NOTE — TELEPHONE ENCOUNTER
Called and left message for her to call back if her symptoms of forgetfulness and memory lapse get worse  Patient did not answer the phone

## 2018-11-28 ENCOUNTER — TELEPHONE (OUTPATIENT)
Dept: OBGYN CLINIC | Facility: CLINIC | Age: 23
End: 2018-11-28

## 2018-11-28 NOTE — TELEPHONE ENCOUNTER
----- Message from [facility] sent at 11/28/2018 10:46 AM EST -----  Pt did not show up for OB visit   Pls follow-up

## 2018-12-03 ENCOUNTER — ROUTINE PRENATAL (OUTPATIENT)
Dept: OBGYN CLINIC | Facility: CLINIC | Age: 23
End: 2018-12-03
Payer: COMMERCIAL

## 2018-12-03 VITALS
SYSTOLIC BLOOD PRESSURE: 117 MMHG | HEIGHT: 67 IN | DIASTOLIC BLOOD PRESSURE: 75 MMHG | HEART RATE: 99 BPM | WEIGHT: 205 LBS | BODY MASS INDEX: 32.18 KG/M2

## 2018-12-03 DIAGNOSIS — Z3A.24 24 WEEKS GESTATION OF PREGNANCY: Primary | ICD-10-CM

## 2018-12-03 PROCEDURE — 99214 OFFICE O/P EST MOD 30 MIN: CPT | Performed by: OBSTETRICS & GYNECOLOGY

## 2018-12-03 NOTE — PROGRESS NOTES
IUP at 24 weeks and 2 days  Patient was positive fetal movement  Denies contractions pelvic pain pressure leakage of fluid  Reviewed signs of  labor and kick counts  Will order 28 week labs  Patient otherwise doing well all questions answered follow-up in 4 weeks and p r n

## 2019-01-09 ENCOUNTER — ROUTINE PRENATAL (OUTPATIENT)
Dept: OBGYN CLINIC | Facility: CLINIC | Age: 24
End: 2019-01-09
Payer: COMMERCIAL

## 2019-01-09 VITALS
WEIGHT: 208 LBS | DIASTOLIC BLOOD PRESSURE: 80 MMHG | BODY MASS INDEX: 36.86 KG/M2 | SYSTOLIC BLOOD PRESSURE: 118 MMHG | HEIGHT: 63 IN

## 2019-01-09 DIAGNOSIS — Z3A.29 29 WEEKS GESTATION OF PREGNANCY: Primary | ICD-10-CM

## 2019-01-09 PROCEDURE — 99214 OFFICE O/P EST MOD 30 MIN: CPT | Performed by: OBSTETRICS & GYNECOLOGY

## 2019-01-09 NOTE — PROGRESS NOTES
IUP at 29 weeks and 4 days  Patient reports positive fetal movement denies contractions leakage of fluid or bleeding  Patient has not completed 28 week labs will get done at her earliest convenience  Reviewed signs of  labor and kick counts all questions answered follow-up in 2 weeks and p r n

## 2019-01-16 ENCOUNTER — LAB (OUTPATIENT)
Dept: LAB | Facility: HOSPITAL | Age: 24
End: 2019-01-16
Attending: OBSTETRICS & GYNECOLOGY
Payer: COMMERCIAL

## 2019-01-16 DIAGNOSIS — Z3A.29 29 WEEKS GESTATION OF PREGNANCY: ICD-10-CM

## 2019-01-16 DIAGNOSIS — O99.013 ANEMIA AFFECTING PREGNANCY IN THIRD TRIMESTER: Primary | ICD-10-CM

## 2019-01-16 LAB
BASOPHILS # BLD AUTO: 0.04 THOUSANDS/ΜL (ref 0–0.1)
BASOPHILS NFR BLD AUTO: 0 % (ref 0–1)
EOSINOPHIL # BLD AUTO: 0.07 THOUSAND/ΜL (ref 0–0.61)
EOSINOPHIL NFR BLD AUTO: 1 % (ref 0–6)
ERYTHROCYTE [DISTWIDTH] IN BLOOD BY AUTOMATED COUNT: 14.2 % (ref 11.6–15.1)
GLUCOSE 1H P 50 G GLC PO SERPL-MCNC: 109 MG/DL
HCT VFR BLD AUTO: 33.7 % (ref 34.8–46.1)
HGB BLD-MCNC: 10.4 G/DL (ref 11.5–15.4)
IMM GRANULOCYTES # BLD AUTO: 0.05 THOUSAND/UL (ref 0–0.2)
IMM GRANULOCYTES NFR BLD AUTO: 1 % (ref 0–2)
LYMPHOCYTES # BLD AUTO: 1.63 THOUSANDS/ΜL (ref 0.6–4.47)
LYMPHOCYTES NFR BLD AUTO: 18 % (ref 14–44)
MCH RBC QN AUTO: 25.7 PG (ref 26.8–34.3)
MCHC RBC AUTO-ENTMCNC: 30.9 G/DL (ref 31.4–37.4)
MCV RBC AUTO: 83 FL (ref 82–98)
MONOCYTES # BLD AUTO: 0.43 THOUSAND/ΜL (ref 0.17–1.22)
MONOCYTES NFR BLD AUTO: 5 % (ref 4–12)
NEUTROPHILS # BLD AUTO: 7.01 THOUSANDS/ΜL (ref 1.85–7.62)
NEUTS SEG NFR BLD AUTO: 75 % (ref 43–75)
NRBC BLD AUTO-RTO: 0 /100 WBCS
PLATELET # BLD AUTO: 283 THOUSANDS/UL (ref 149–390)
PMV BLD AUTO: 9.5 FL (ref 8.9–12.7)
RBC # BLD AUTO: 4.05 MILLION/UL (ref 3.81–5.12)
RPR SER QL: NORMAL
WBC # BLD AUTO: 9.23 THOUSAND/UL (ref 4.31–10.16)

## 2019-01-16 PROCEDURE — 36415 COLL VENOUS BLD VENIPUNCTURE: CPT

## 2019-01-16 PROCEDURE — 82950 GLUCOSE TEST: CPT

## 2019-01-16 PROCEDURE — 86592 SYPHILIS TEST NON-TREP QUAL: CPT

## 2019-01-16 PROCEDURE — 85025 COMPLETE CBC W/AUTO DIFF WBC: CPT

## 2019-01-16 RX ORDER — FERROUS SULFATE TAB EC 324 MG (65 MG FE EQUIVALENT) 324 (65 FE) MG
324 TABLET DELAYED RESPONSE ORAL
Qty: 30 TABLET | Refills: 3 | Status: SHIPPED | OUTPATIENT
Start: 2019-01-16

## 2019-01-22 ENCOUNTER — ROUTINE PRENATAL (OUTPATIENT)
Dept: OBGYN CLINIC | Facility: CLINIC | Age: 24
End: 2019-01-22
Payer: COMMERCIAL

## 2019-01-22 ENCOUNTER — ULTRASOUND (OUTPATIENT)
Dept: OBGYN CLINIC | Facility: CLINIC | Age: 24
End: 2019-01-22
Payer: COMMERCIAL

## 2019-01-22 VITALS
DIASTOLIC BLOOD PRESSURE: 64 MMHG | HEART RATE: 92 BPM | HEIGHT: 67 IN | SYSTOLIC BLOOD PRESSURE: 118 MMHG | WEIGHT: 209 LBS | BODY MASS INDEX: 32.8 KG/M2

## 2019-01-22 DIAGNOSIS — Z3A.31 31 WEEKS GESTATION OF PREGNANCY: Primary | ICD-10-CM

## 2019-01-22 DIAGNOSIS — M54.50 LOW BACK PAIN DURING PREGNANCY IN THIRD TRIMESTER: ICD-10-CM

## 2019-01-22 DIAGNOSIS — O26.893 LOW BACK PAIN DURING PREGNANCY IN THIRD TRIMESTER: ICD-10-CM

## 2019-01-22 DIAGNOSIS — Z36.89 ENCOUNTER FOR ULTRASOUND TO ASSESS FETAL GROWTH: Primary | ICD-10-CM

## 2019-01-22 PROCEDURE — 76816 OB US FOLLOW-UP PER FETUS: CPT | Performed by: OBSTETRICS & GYNECOLOGY

## 2019-01-22 PROCEDURE — 99213 OFFICE O/P EST LOW 20 MIN: CPT | Performed by: OBSTETRICS & GYNECOLOGY

## 2019-01-22 NOTE — PROGRESS NOTES
The patient is here for a routine OB visit  She has no complaints  She reports no contractions, vaginal bleeding or leakage of fluid  She reports good fetal movement  She had a growth scan today that showed EFW 37%, repeat in 4 weeks  Pt refused TDAp and flu vaccine  She will return in 2 weeks  Pt complaining of pain in low back pain, advised Tylenol and heat  She also states that she also has some trouble walking  Referred pt to physical therap

## 2019-01-22 NOTE — PATIENT INSTRUCTIONS
Pregnancy at 31 to 34 [address]:   You may continue to have symptoms such as shortness of breath, heartburn, contractions, or swelling of your ankles and feet  You may be gaining about 1 pound a week now  DISCHARGE INSTRUCTIONS:   Return to the emergency department if:   · You develop a severe headache that does not go away  · You have new or increased vision changes, such as blurred or spotted vision  · You have new or increased swelling in your face or hands  · You have vaginal spotting or bleeding  · Your water broke or you feel warm water gushing or trickling from your vagina  Contact your healthcare provider if:   · You have more than 5 contractions in 1 hour  · You notice any changes in your baby's movements  · You have abdominal cramps, pressure, or tightening  · You have a change in vaginal discharge  · You have chills or a fever  · You have vaginal itching, burning, or pain  · You have yellow, green, white, or foul-smelling vaginal discharge  · You have pain or burning when you urinate, less urine than usual, or pink or bloody urine  · You have questions or concerns about your condition or care  How to care for yourself at this stage of your pregnancy:   · Eat a variety of healthy foods  Healthy foods include fruits, vegetables, whole-grain breads, low-fat dairy foods, beans, lean meats, and fish  Drink liquids as directed  Ask how much liquid to drink each day and which liquids are best for you  Limit caffeine to less than 200 milligrams each day  Limit your intake of fish to 2 servings each week  Choose fish low in mercury such as canned light tuna, shrimp, salmon, cod, or tilapia  Do not  eat fish high in mercury such as swordfish, tilefish, raeann mackerel, and shark  · Manage heartburn  by eating 4 or 5 small meals each day instead of large meals  Avoid spicy food  · Manage swelling  by lying down and putting your feet up       · Take prenatal vitamins as directed  Your need for certain vitamins and minerals, such as folic acid, increases during pregnancy  Prenatal vitamins provide some of the extra vitamins and minerals you need  Prenatal vitamins may also help to decrease the risk of certain birth defects  · Talk to your healthcare provider about exercise  Moderate exercise can help you stay fit  Your healthcare provider will help you plan an exercise program that is safe for you during pregnancy  · Do not smoke  If you smoke, it is never too late to quit  Smoking increases your risk of a miscarriage and other health problems during your pregnancy  Smoking can cause your baby to be born too early or weigh less at birth  Ask your healthcare provider for information if you need help quitting  · Do not drink alcohol  Alcohol passes from your body to your baby through the placenta  It can affect your baby's brain development and cause fetal alcohol syndrome (FAS)  FAS is a group of conditions that causes mental, behavior, and growth problems  · Talk to your healthcare provider before you take any medicines  Many medicines may harm your baby if you take them when you are pregnant  Do not take any medicines, vitamins, herbs, or supplements without first talking to your healthcare provider  Never use illegal or street drugs (such as marijuana or cocaine) while you are pregnant  Safety tips during pregnancy:   · Avoid hot tubs and saunas  Do not use a hot tub or sauna while you are pregnant, especially during your first trimester  Hot tubs and saunas may raise your baby's temperature and increase the risk of birth defects  · Avoid toxoplasmosis  This is an infection caused by eating raw meat or being around infected cat feces  It can cause birth defects, miscarriages, and other problems  Wash your hands after you touch raw meat  Make sure any meat is well-cooked before you eat it  Avoid raw eggs and unpasteurized milk   Use gloves or ask someone else to clean your cat's litter box while you are pregnant  Changes that are happening with your baby:  By 34 weeks, your baby may weigh more than 5 pounds  Your baby will be about 12 ½ inches long from the top of the head to the rump (baby's bottom)  Your baby is gaining about ½ pound a week  Your baby's eyes open and close now  Your baby's kicks and movements are more forceful at this time  What you need to know about prenatal care: Your healthcare provider will check your blood pressure and weight  You may also need the following:  · A urine test  may also be done to check for sugar and protein  These can be signs of gestational diabetes or infection  Protein in your urine may also be a sign of preeclampsia  Preeclampsia is a condition that can develop during week 20 or later of your pregnancy  It causes high blood pressure, and it can cause problems with your kidneys and other organs  · A Tdap vaccine  may be recommended by your healthcare provider  · Fundal height  is a measurement of your uterus to check your baby's growth  This number is usually the same as the number of weeks that you have been pregnant  Your healthcare provider may also check your baby's position  · Your baby's heart rate  will be checked  © 2017 [employer]  Information is for End User's use only and may not be sold, redistributed or otherwise used for commercial purposes  All illustrations and images included in CareNotes® are the copyrighted property of [employer]  or [employer]  The above information is an  only  It is not intended as medical advice for individual conditions or treatments  Talk to your doctor, nurse or pharmacist before following any medical regimen to see if it is safe and effective for you  Kick Counts in Pregnancy   WHAT YOU NEED TO KNOW:   Kick counts measure how much your baby is moving in your womb   A kick from your baby can be felt as a twist, turn, swish, roll, or jab  It is common to feel your baby kicking at 26 to 28 weeks of pregnancy  You may feel your baby kick as early as 20 weeks of pregnancy  DISCHARGE INSTRUCTIONS:   Return to the emergency department if:   · You feel your baby kick less as the day goes on      · You do not feel any kicks in a day  Contact your healthcare provider if:   · You feel a change in the number of kicks or movements of your baby  · You feel fewer than 10 kicks within 2 hours after counting twice  · You have questions or concerns about your baby's movements  Why measure kick counts:  Your baby's movement may provide information about your baby's health  He may move less, or not at all, if there are problems  He may move less if he does not have enough room to grow in your uterus (womb)  He may also move less if he is not getting enough oxygen or nutrition from the placenta  Tell your healthcare provider as soon as you feel a change in your baby's movements  Problems that are found earlier are easier to treat  When to measure kick counts:   · Measure kick counts at the same time every day  · Measure kick counts when your baby is awake and most active  Your baby may be most active in the evening  · Measure kick counts after a meal or snack  Your baby may be more active after you eat  Wait 2 hours after you drink liquids that contain caffeine  Caffeine can make your baby more active than usual     · You should not smoke while you are pregnant  Smoking increases the risk of health problems for you and for your baby during your pregnancy  If you do smoke, wait 2 hours to measure kick counts  Nicotine can make your baby more active than usual   How to measure kick counts:  Check that your baby is awake before you measure kick counts  You can wake up your baby by lightly pushing on your belly, walking, or drinking something cold   Your healthcare provider may tell you different ways to measure kick counts  He may tell you to do the following:  · Use a chart or clock to keep track of the time you start and finish counting  · Sit in a chair or lie on your left side  · Place your hands on the largest part of your belly  · Count until you reach 10 kicks  Write down how much time it takes to count 10 kicks  · It may take 30 minutes to 2 hours to count 10 kicks  It should not take more than 2 hours to count 10 kicks  · If you do not feel 10 kicks within 2 hours, wait 1 hour and count again  Your baby can sleep for up to 40 minutes at one time  Follow up with your healthcare provider as directed:  Write down your questions so you remember to ask them during your visits  © 2017 [employer] Information is for End User's use only and may not be sold, redistributed or otherwise used for commercial purposes  All illustrations and images included in CareNotes® are the copyrighted property of A D [employer] M [employer] Inc  or [employer]ath Comer  The above information is an  only  It is not intended as medical advice for individual conditions or treatments  Talk to your doctor, nurse or pharmacist before following any medical regimen to see if it is safe and effective for you

## 2019-01-28 ENCOUNTER — EVALUATION (OUTPATIENT)
Dept: PHYSICAL THERAPY | Facility: REHABILITATION | Age: 24
End: 2019-01-28
Payer: COMMERCIAL

## 2019-01-28 DIAGNOSIS — M54.50 LOW BACK PAIN DURING PREGNANCY IN THIRD TRIMESTER: Primary | ICD-10-CM

## 2019-01-28 DIAGNOSIS — O26.893 LOW BACK PAIN DURING PREGNANCY IN THIRD TRIMESTER: Primary | ICD-10-CM

## 2019-01-28 PROCEDURE — G8991 OTHER PT/OT GOAL STATUS: HCPCS | Performed by: PHYSICAL THERAPIST

## 2019-01-28 PROCEDURE — 97112 NEUROMUSCULAR REEDUCATION: CPT | Performed by: PHYSICAL THERAPIST

## 2019-01-28 PROCEDURE — 97110 THERAPEUTIC EXERCISES: CPT | Performed by: PHYSICAL THERAPIST

## 2019-01-28 PROCEDURE — 97162 PT EVAL MOD COMPLEX 30 MIN: CPT | Performed by: PHYSICAL THERAPIST

## 2019-01-28 PROCEDURE — G8990 OTHER PT/OT CURRENT STATUS: HCPCS | Performed by: PHYSICAL THERAPIST

## 2019-01-28 NOTE — PROGRESS NOTES
PT Evaluation     Today's date: 2019  Patient name: Ana Laura Oliver  : 1995  MRN: [id number]  Referring provider: Wilbur Guillermo MD  Dx:   Encounter Diagnosis     ICD-10-CM    1  Low back pain during pregnancy in third trimester O26 893 Ambulatory referral to Physical Therapy    M54 5                   Assessment  Assessment details: Ana Laura Oliver is a pleasant 21 y o  female who presents with chronic low back pain  The patient's greatest concerns are fear of not being able to keep active, and a fear of not being able to pass the physical evaluation to become a   No further referral appears necessary at this time based upon examination results  Primary movement impairment diagnosis of lumbar spine movement dysfunction secondary to multiple child births resulting in pathoanatomical symptoms of Low back pain during pregnancy in third trimester and limiting her ability to don/doff socks and shoes, to stand for long periods of time, to drive for long periods of time, and walking prolonged periods  Impairments include:  1) pain with function   2) decreased strength   3) poor posture     Etiologic factors include multiple child births  Impairments: abnormal coordination, abnormal muscle firing, abnormal muscle tone, abnormal or restricted ROM, abnormal movement, activity intolerance, difficulty understanding, impaired physical strength, lacks appropriate home exercise program, pain with function, poor posture  and poor body mechanics    Symptom irritability: highUnderstanding of Dx/Px/POC: good   Prognosis: good  Prognosis details: Positive prognostic indicators include positive attitude toward recovery  Negative prognostic indicators include chronicity of symptoms  Goals  Short Term Goal 1 Patient will be independent with home exercise program    Short Term Goal 2: Patient will be able to manage symptoms independently       Long Term Goal 1: Patient will be able to stand at kitchen counter and clean dishes without back pain  Long Term Goal 2: Patient will be able to walk for 20 minutes without back pain  Plan  Plan details: Prognosis above is given PT services 2x/week tapering to 1x/week over the next 2 months and home program adherence  Patient would benefit from: skilled physical therapy  Planned modality interventions: thermotherapy: hydrocollator packs  Planned therapy interventions: activity modification, joint mobilization, manual therapy, motor coordination training, neuromuscular re-education, patient education, self care, therapeutic activities, therapeutic exercise, graded activity, home exercise program and behavior modification  Plan of Care beginning date: 2019  Plan of Care expiration date: 3/25/2019  Treatment plan discussed with: patient        Subjective Evaluation    Pain  Current pain ratin  At best pain ratin  At worst pain rating: 10  Quality: sharp and dull ache (sitting and standing for long periods is dull ache, but moving in and out of positions elicits sharp pain )  Relieving factors: change in position, relaxation and rest (Position of comfort is laying on side )  Aggravating factors: lifting, sitting, stair climbing, walking and standing  Progression: worsening    Treatments  Current treatment: physical therapy  Patient Goals  Patient goals for therapy: decreased pain, increased strength and independence with ADLs/IADLs  Patient goal: Patient's goal is to transition into a  after she finishes giving birth  Here with physical therapy, the patient would like to be able to exercise and move better to giver her the tools to pass the exam to get criminal justice degree and be a             Objective     General Comments     Lumbar Comments  History of Pertinent Illness: Patient reports that she has been having back pain for years, specifically since , when she was previously pregnant with her daughter  She states when she was walking back during that time, she really had an acute onset of back pain  After the birth of her initial child, she feels that it improved, but then the pain returned eventually  She states that the pain has gotten worse overtime, and it has now returned as she is in her third trimester with her pregnancy  Functional Limitations: Getting pants on, specifically having to lift her legs to get her pants, transitioning in and out of bed, getting in and out of the car, and standing tolerance is poor especially when cleaning dishes, laundry, and other household activities  Social History: Living with mother in 3 story home, with about 10 steps to get to second and third floor and railing on R side  Her mother is able to help when she is available, as well as her son who is 7  Occupation: Patient works in customer service, 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  Range of Motion:   Lumbar Flexion: 50% limited secondary to pregnancy, no pain   Lumbar Extension: 50% limited with reported tightness, no pain  Lumbar Sidebending: R WNL no pain, L WNL no pain     Manual Muscle Testing:   Hip Flexion: 4/5 bilaterally   Hip abduction seated: 4/5 bilaterally   Hip adduction seated: 4-/5 bilaterally   Hip IR: 4-/5 bilaterally   Hip ER: 4-/5 bilaterally   Knee Extension: 4/5 bilaterally   Knee Flexion: 4-/5 bilaterally   Ankle Inversion: R 4-/5 with pain L 4+/5    Lower Quarter Screen:   Dermatomes: unremarkable  Myotomes: unremarkable  Reflexes: achilles tendon and patellar tendon 2+ intact bilaterally      Gait: slightly pronated on both feet when ambulating, decreased arm swing bilaterally, increased lumbar lordosis     Standing Posture: increased lumbar lordosis, forefoot valgus bilaterally  Precautions: Pregnant in Third Trimester  Avoid supine for prolonged periods, No moist heat   Adhere to other precautions/contraindications for exercise for patients in third trimester of pregnancy      Daily Treatment Diary     Manual                                                                                   Exercise Diary  1 28            Lumbar roll outs seated with physioball 1x8            Posterior pelvic tilts seated 1x8            Open book thoracic stretch              Mini squats  1x8            Step ups              Quadruped cat camel  1x8                                                                                                                                                                                                      Modalities

## 2019-02-04 ENCOUNTER — OFFICE VISIT (OUTPATIENT)
Dept: PHYSICAL THERAPY | Facility: REHABILITATION | Age: 24
End: 2019-02-04
Payer: COMMERCIAL

## 2019-02-04 DIAGNOSIS — O26.893 LOW BACK PAIN DURING PREGNANCY IN THIRD TRIMESTER: Primary | ICD-10-CM

## 2019-02-04 DIAGNOSIS — M54.50 LOW BACK PAIN DURING PREGNANCY IN THIRD TRIMESTER: Primary | ICD-10-CM

## 2019-02-04 PROCEDURE — 97112 NEUROMUSCULAR REEDUCATION: CPT | Performed by: PHYSICAL THERAPIST

## 2019-02-04 PROCEDURE — 97110 THERAPEUTIC EXERCISES: CPT | Performed by: PHYSICAL THERAPIST

## 2019-02-04 NOTE — PROGRESS NOTES
Daily Note     Today's date: 2019  Patient name: Solo Crowell  : 1995  MRN: [id number]  Referring provider: Kelly Hale MD  Dx: No diagnosis found  Subjective: Patient reports she has been doing well with her exercises  She is still having slight pain in her low back, but she thinks the exercises have been helping  She also reports that getting in and out of her bed has become much easier  Objective: See treatment diary below  Manual                                                                                   Exercise Diary  1 28 2 4           Lumbar roll outs seated with physioball 1x8 1x10 (R, middle, L) 5 sec holds            Posterior pelvic tilts seated 1x8            Open book thoracic stretch              Mini squats  1x8 2x8           Step ups   2x8 B (no risers)           Quadruped cat camel  1x8 2x8           Quadruped opposite arm reach   1x8           Lateral step ups   Hold! Standing hip abduction  1x10 B            Standing hip extension   Hold! Modalities                                                         Assessment: Tolerated treatment well  Standing hip extension and lateral step ups reproduced back pain so patient was educated to discontinue these exercises  She did well with introduction of forward step ups as well as quadruped opposite arm exercise  She was educated to keep up with her home exercise program on a consistent basis  Patient asked PT about abdominal brace for support  PT explained that patient may use it; however, it will be important to not rely on the brace as it can "shut down" core stabilizers the patient needs to use on a consistent basis  Patient demonstrated fatigue post treatment today  Plan: Continue per plan of care

## 2019-02-05 ENCOUNTER — ROUTINE PRENATAL (OUTPATIENT)
Dept: OBGYN CLINIC | Facility: CLINIC | Age: 24
End: 2019-02-05
Payer: COMMERCIAL

## 2019-02-05 VITALS
HEART RATE: 88 BPM | WEIGHT: 208 LBS | SYSTOLIC BLOOD PRESSURE: 110 MMHG | HEIGHT: 67 IN | DIASTOLIC BLOOD PRESSURE: 82 MMHG | BODY MASS INDEX: 32.65 KG/M2

## 2019-02-05 DIAGNOSIS — Z3A.33 33 WEEKS GESTATION OF PREGNANCY: Primary | ICD-10-CM

## 2019-02-05 PROCEDURE — 99213 OFFICE O/P EST LOW 20 MIN: CPT | Performed by: OBSTETRICS & GYNECOLOGY

## 2019-02-05 NOTE — PATIENT INSTRUCTIONS
Pregnancy at 31 to [address]:   You may continue to have symptoms such as shortness of breath, heartburn, contractions, or swelling of your ankles and feet  You may be gaining about 1 pound a week now  DISCHARGE INSTRUCTIONS:   Return to the emergency department if:   · You develop a severe headache that does not go away  · You have new or increased vision changes, such as blurred or spotted vision  · You have new or increased swelling in your face or hands  · You have vaginal spotting or bleeding  · Your water broke or you feel warm water gushing or trickling from your vagina  Contact your healthcare provider if:   · You have more than 5 contractions in 1 hour  · You notice any changes in your baby's movements  · You have abdominal cramps, pressure, or tightening  · You have a change in vaginal discharge  · You have chills or a fever  · You have vaginal itching, burning, or pain  · You have yellow, green, white, or foul-smelling vaginal discharge  · You have pain or burning when you urinate, less urine than usual, or pink or bloody urine  · You have questions or concerns about your condition or care  How to care for yourself at this stage of your pregnancy:   · Eat a variety of healthy foods  Healthy foods include fruits, vegetables, whole-grain breads, low-fat dairy foods, beans, lean meats, and fish  Drink liquids as directed  Ask how much liquid to drink each day and which liquids are best for you  Limit caffeine to less than 200 milligrams each day  Limit your intake of fish to 2 servings each week  Choose fish low in mercury such as canned light tuna, shrimp, salmon, cod, or tilapia  Do not  eat fish high in mercury such as swordfish, tilefish, raeann mackerel, and shark  · Manage heartburn  by eating 4 or 5 small meals each day instead of large meals  Avoid spicy food  · Manage swelling  by lying down and putting your feet up       · Take prenatal vitamins as directed  Your need for certain vitamins and minerals, such as folic acid, increases during pregnancy  Prenatal vitamins provide some of the extra vitamins and minerals you need  Prenatal vitamins may also help to decrease the risk of certain birth defects  · Talk to your healthcare provider about exercise  Moderate exercise can help you stay fit  Your healthcare provider will help you plan an exercise program that is safe for you during pregnancy  · Do not smoke  If you smoke, it is never too late to quit  Smoking increases your risk of a miscarriage and other health problems during your pregnancy  Smoking can cause your baby to be born too early or weigh less at birth  Ask your healthcare provider for information if you need help quitting  · Do not drink alcohol  Alcohol passes from your body to your baby through the placenta  It can affect your baby's brain development and cause fetal alcohol syndrome (FAS)  FAS is a group of conditions that causes mental, behavior, and growth problems  · Talk to your healthcare provider before you take any medicines  Many medicines may harm your baby if you take them when you are pregnant  Do not take any medicines, vitamins, herbs, or supplements without first talking to your healthcare provider  Never use illegal or street drugs (such as marijuana or cocaine) while you are pregnant  Safety tips during pregnancy:   · Avoid hot tubs and saunas  Do not use a hot tub or sauna while you are pregnant, especially during your first trimester  Hot tubs and saunas may raise your baby's temperature and increase the risk of birth defects  · Avoid toxoplasmosis  This is an infection caused by eating raw meat or being around infected cat feces  It can cause birth defects, miscarriages, and other problems  Wash your hands after you touch raw meat  Make sure any meat is well-cooked before you eat it  Avoid raw eggs and unpasteurized milk   Use gloves or ask someone else to clean your cat's litter box while you are pregnant  Changes that are happening with your baby:  By 34 weeks, your baby may weigh more than 5 pounds  Your baby will be about 12 ½ inches long from the top of the head to the rump (baby's bottom)  Your baby is gaining about ½ pound a week  Your baby's eyes open and close now  Your baby's kicks and movements are more forceful at this time  What you need to know about prenatal care: Your healthcare provider will check your blood pressure and weight  You may also need the following:  · A urine test  may also be done to check for sugar and protein  These can be signs of gestational diabetes or infection  Protein in your urine may also be a sign of preeclampsia  Preeclampsia is a condition that can develop during week 20 or later of your pregnancy  It causes high blood pressure, and it can cause problems with your kidneys and other organs  · A Tdap vaccine  may be recommended by your healthcare provider  · Fundal height  is a measurement of your uterus to check your baby's growth  This number is usually the same as the number of weeks that you have been pregnant  Your healthcare provider may also check your baby's position  · Your baby's heart rate  will be checked  © 2017 [employer]  Information is for End User's use only and may not be sold, redistributed or otherwise used for commercial purposes  All illustrations and images included in CareNotes® are the copyrighted property of "[employer]  or [employer]  The above information is an  only  It is not intended as medical advice for individual conditions or treatments  Talk to your doctor, nurse or pharmacist before following any medical regimen to see if it is safe and effective for you

## 2019-02-05 NOTE — PROGRESS NOTES
Patient here for routine OB visit  She is undergoing PT for pelvic pain and back pain  She has rare contractions  She has no bleeding or LOF  Follow-up in 2 weeks for routine PNV and growth scan

## 2019-02-07 ENCOUNTER — OFFICE VISIT (OUTPATIENT)
Dept: PHYSICAL THERAPY | Facility: REHABILITATION | Age: 24
End: 2019-02-07
Payer: COMMERCIAL

## 2019-02-07 DIAGNOSIS — O26.893 LOW BACK PAIN DURING PREGNANCY IN THIRD TRIMESTER: Primary | ICD-10-CM

## 2019-02-07 DIAGNOSIS — M54.50 LOW BACK PAIN DURING PREGNANCY IN THIRD TRIMESTER: Primary | ICD-10-CM

## 2019-02-07 PROCEDURE — 97110 THERAPEUTIC EXERCISES: CPT | Performed by: PHYSICAL THERAPIST

## 2019-02-07 PROCEDURE — 97112 NEUROMUSCULAR REEDUCATION: CPT | Performed by: PHYSICAL THERAPIST

## 2019-02-07 NOTE — PROGRESS NOTES
Daily Note     Today's date: 2019  Patient name: Alexis Espino  : 1995  MRN: [id number]  Referring provider: Laura Rodrigez MD  Dx:   Encounter Diagnosis     ICD-10-CM    1  Low back pain during pregnancy in third trimester O26 893     M54 5                   Subjective: Patient reports that she is still having slight pain in her low back, but she is feeling decent today  Objective: See treatment diary below  Manual                                                                                   Exercise Diary  1 28 2 4 2 7          Lumbar roll outs seated with physioball 1x8 1x10 (R, middle, L) 5 sec holds  1x10 (R, middle, L) 5 sec holds           Posterior pelvic tilts seated 1x8            Open book thoracic stretch              Mini squats  1x8 2x8 2x8          Step ups   2x8 B (no risers) 2x8 B (no risers)           Quadruped cat camel  1x8 2x8 2x8          Quadruped opposite arm reach   1x8 1x8          Lateral step ups   Hold! Standing hip abduction  1x10 B  1x10 B          Standing hip extension   Hold! Modalities                                                         Assessment: Tolerated treatment well  After her session on Tuesday, the patient did not have any "flare ups" or exacerbation of back pain  The patient still has slight pain with standing hip abduction, the right leg more than the left, but the pain isn't reported as severe  The patient required cueing to tilt her pelvis posteriorly with standing exercises to improve technique as well as prevent any onset of back pain  Patient demonstrated fatigue post treatment      Plan: Continue per plan of care

## 2019-02-08 ENCOUNTER — OFFICE VISIT (OUTPATIENT)
Dept: PHYSICAL THERAPY | Facility: REHABILITATION | Age: 24
End: 2019-02-08
Payer: COMMERCIAL

## 2019-02-08 DIAGNOSIS — O26.893 LOW BACK PAIN DURING PREGNANCY IN THIRD TRIMESTER: Primary | ICD-10-CM

## 2019-02-08 DIAGNOSIS — M54.50 LOW BACK PAIN DURING PREGNANCY IN THIRD TRIMESTER: Primary | ICD-10-CM

## 2019-02-08 PROCEDURE — 97110 THERAPEUTIC EXERCISES: CPT | Performed by: PHYSICAL THERAPIST

## 2019-02-08 PROCEDURE — 97140 MANUAL THERAPY 1/> REGIONS: CPT | Performed by: PHYSICAL THERAPIST

## 2019-02-08 NOTE — PROGRESS NOTES
Daily Note     Today's date: 2019  Patient name: Eve Carreon  : 1995  MRN: [id number]  Referring provider: Melody Bass MD  Dx: No diagnosis found  Subjective: Patient reports that she felt good after yesterday's session with physical therapy  Objective: See treatment diary below  Manual  2 8            IASTM lumbar spine bilaterally  SE            Prone quad stretch bilaterally  SE                                                       Exercise Diary  1 28 2 4 2 7 2 8         Lumbar roll outs seated with physioball 1x8 1x10 (R, middle, L) 5 sec holds  1x10 (R, middle, L) 5 sec holds           Posterior pelvic tilts seated 1x8            Open book thoracic stretch              Mini squats  1x8 2x8 2x8 2x10         Step ups   2x8 B (no risers) 2x8 B (no risers)  2x10 B (no risers)          Quadruped cat camel  1x8 2x8 2x8          Quadruped opposite arm reach   1x8 1x8          Lateral step ups   Hold! Standing hip abduction  1x10 B  1x10 B 1x10 B          Standing hip extension   Hold! A-P pelvic tilts and side to side rocking on physioball     4' each                                                                                                                                   Modalities                                                           Assessment: Tolerated treatment well today  She did well with a warm up today of anterior and posterior pelvis tilting on a physioball as well as side to side rocking on the physioball  She did well with standing exercises followed by manual therapy of the lumbar spine and stretching of the quadriceps  The patient felt immediate relief when laying prone on the pregnancy plinth, as well as when manual therapy was performed  PT educated the patient on continuing exercises at home, and remaining cognizant of having good posture  Patient felt relief at the end of the session today  Plan: Continue per plan of care

## 2019-02-11 ENCOUNTER — APPOINTMENT (OUTPATIENT)
Dept: PHYSICAL THERAPY | Facility: REHABILITATION | Age: 24
End: 2019-02-11
Payer: COMMERCIAL

## 2019-02-14 ENCOUNTER — OFFICE VISIT (OUTPATIENT)
Dept: PHYSICAL THERAPY | Facility: REHABILITATION | Age: 24
End: 2019-02-14
Payer: COMMERCIAL

## 2019-02-14 DIAGNOSIS — M54.50 LOW BACK PAIN DURING PREGNANCY IN THIRD TRIMESTER: Primary | ICD-10-CM

## 2019-02-14 DIAGNOSIS — O26.893 LOW BACK PAIN DURING PREGNANCY IN THIRD TRIMESTER: Primary | ICD-10-CM

## 2019-02-14 PROCEDURE — 97140 MANUAL THERAPY 1/> REGIONS: CPT | Performed by: PHYSICAL THERAPIST

## 2019-02-14 PROCEDURE — 97110 THERAPEUTIC EXERCISES: CPT | Performed by: PHYSICAL THERAPIST

## 2019-02-14 NOTE — PROGRESS NOTES
Daily Note     Today's date: 2019  Patient name: Huong Kelly  : 1995  MRN: [id number]  Referring provider: Zeb Patricia MD  Dx:   Encounter Diagnosis     ICD-10-CM    1  Low back pain during pregnancy in third trimester O26 893     M54 5                   Subjective: Patient reports that she has been feeling better, and the exercises have not bothered her back as much as they used to  She also states that she switched her bed around to sleep on the firmer aspect of the bed and that has helped her as well  Objective: See treatment diary below  Manual  2 8 2 14           IASTM lumbar spine bilaterally  SE SE           Prone quad stretch bilaterally  SE SE           Prone hip flexor stretch bilaterally   SE                                         Exercise Diary  1 28 2 4 2 7 2 8 2 14        Lumbar roll outs seated with physioball 1x8 1x10 (R, middle, L) 5 sec holds  1x10 (R, middle, L) 5 sec holds           Posterior pelvic tilts seated 1x8            Open book thoracic stretch              Mini squats  1x8 2x8 2x8 2x10 3x10        Step ups   2x8 B (no risers) 2x8 B (no risers)  2x10 B (no risers)  3x10 B (no risers)         Quadruped cat camel  1x8 2x8 2x8          Quadruped opposite arm reach   1x8 1x8          Lateral step ups   Hold! Standing hip abduction  1x10 B  1x10 B 1x10 B  1x5 B        Standing hip extension   Hold! A-P pelvic tilts and side to side rocking on physioball     4' each  4' each                                                                                                                                 Modalities                                                         Assessment: Tolerated treatment well  The patient reported that she has really been enjoying physical therapy, and feels she is really benefiting from the exercises that have been prescribed   The patient still has difficulty with weight bearing on a single limb and performing a dynamic movement of the other lower extremity, such as standing hip abduction and extension  The PT explained that the patient will need to build core strength after her child birth to stabilize her low back, and then progress towards these more dynamic movements  The patient has a good understanding of exercises, and moves throughout her positions with proper technique, and control  She benefited from IASTM to the low back, as well as prone quad stretch and hip flexor stretch  She needed the cue of not lifting her hip with the PT into extension during the prone stretch to prevent her low back extensors from firing and aggravating her symptoms  Once the patient allowed the PT to move her leg without helping, she felt a good stretch, and did not have pain Patient would benefit from continued PT to improve strength of the core and lower extremities, and allow optimal function as she continues through her third trimester of pregnancy  Plan: Continue per plan of care

## 2019-02-15 ENCOUNTER — OFFICE VISIT (OUTPATIENT)
Dept: PHYSICAL THERAPY | Facility: REHABILITATION | Age: 24
End: 2019-02-15
Payer: COMMERCIAL

## 2019-02-15 DIAGNOSIS — M54.50 LOW BACK PAIN DURING PREGNANCY IN THIRD TRIMESTER: Primary | ICD-10-CM

## 2019-02-15 DIAGNOSIS — O26.893 LOW BACK PAIN DURING PREGNANCY IN THIRD TRIMESTER: Primary | ICD-10-CM

## 2019-02-15 PROCEDURE — 97140 MANUAL THERAPY 1/> REGIONS: CPT | Performed by: PHYSICAL THERAPIST

## 2019-02-15 PROCEDURE — 97110 THERAPEUTIC EXERCISES: CPT | Performed by: PHYSICAL THERAPIST

## 2019-02-15 NOTE — PROGRESS NOTES
Daily Note     Today's date: 2/15/2019  Patient name: Laura Pate  : 1995  MRN: [id number]  Referring provider: Marcy Cooper MD  Dx:   Encounter Diagnosis     ICD-10-CM    1  Low back pain during pregnancy in third trimester O26 893     M54 5                   Subjective: Patient reports that she has been noticing the pain at night, but she thinks it is more so related to the pregnancy  She states that she is feeling better when getting in and out of the car, and transitioning from different positions, and does not get the sharp pain in her back as often with these activities  Objective: See treatment diary below  Manual  2 8 2 14 2 15          IASTM lumbar spine bilaterally  SE SE SE          Prone quad stretch bilaterally  SE SE SE          Prone hip flexor stretch bilaterally   SE SE                                        Exercise Diary  1 28 2 4 2 7 2 8 2 14 2 15       Lumbar roll outs seated with physioball 1x8 1x10 (R, middle, L) 5 sec holds  1x10 (R, middle, L) 5 sec holds           Posterior pelvic tilts seated 1x8            Open book thoracic stretch              Mini squats  1x8 2x8 2x8 2x10 3x10 3x10       Step ups   2x8 B (no risers) 2x8 B (no risers)  2x10 B (no risers)  3x10 B (no risers)  3x10 B (no risers)        Quadruped cat camel  1x8 2x8 2x8          Quadruped opposite arm reach   1x8 1x8          Lateral step ups   Hold! Standing hip abduction  1x10 B  1x10 B 1x10 B  1x5 B        Standing hip extension   Hold! A-P pelvic tilts and side to side rocking on physioball     4' each  4' each 4' each                                                                                                                                 Modalities                                                       Assessment: Tolerated treatment well   Patient did slightly less exercise today than her usual sessions as she was in the clinic yesterday and fatigued more quickly today secondary to lack of muscle recovery from previous visit  The patient continues to do well with her exercises, and executes them with good technique  She requires minimal to no cueing with step ups and mini squats  She occasionally needs a cue to tilt her pelvis back to prevent over arching of the low back, especially with standing exercises  She continues to benefit from manual therapy of the low back as well as stretching of the lower extremities to decrease low back symptoms  She did have slight discomfort in her low back on the left side with a R prone hip flexor stretch, but the amount of stretch was regressed and the pain discomfort subsided  Patient would benefit from continued PT to promote improvement in posture, strengthen the core, strengthen the lower extremities, and alleviate tightness of the lumbar spine  Plan: Continue per plan of care

## 2019-02-18 ENCOUNTER — OFFICE VISIT (OUTPATIENT)
Dept: PHYSICAL THERAPY | Facility: REHABILITATION | Age: 24
End: 2019-02-18
Payer: COMMERCIAL

## 2019-02-18 DIAGNOSIS — M54.50 LOW BACK PAIN DURING PREGNANCY IN THIRD TRIMESTER: Primary | ICD-10-CM

## 2019-02-18 DIAGNOSIS — O26.893 LOW BACK PAIN DURING PREGNANCY IN THIRD TRIMESTER: Primary | ICD-10-CM

## 2019-02-18 PROCEDURE — 97110 THERAPEUTIC EXERCISES: CPT | Performed by: PHYSICAL THERAPIST

## 2019-02-18 PROCEDURE — 97140 MANUAL THERAPY 1/> REGIONS: CPT | Performed by: PHYSICAL THERAPIST

## 2019-02-18 NOTE — PROGRESS NOTES
Daily Note     Today's date: 2019  Patient name: Ana Laura Oliver  : 1995  MRN: [id number]  Referring provider: Wilbur Guillermo MD  Dx:   Encounter Diagnosis     ICD-10-CM    1  Low back pain during pregnancy in third trimester O26 893     M54 5                   Subjective: Patient reports her back is doing pretty good; however, she got sick over the weekend with cold-like symptoms, and is not feeling good because of that  She states that she has also been nauseous secondary to her pregnancy  Objective: See treatment diary below  Manual  2 8 2 14 2 15 2 18         IASTM lumbar spine bilaterally  SE SE SE SE         Prone quad stretch bilaterally  SE SE SE SE         Prone hip flexor stretch bilaterally   SE SE SE                                       Exercise Diary  1 28 2 4 2 7 2 8 2 14 2 15 2 18      Lumbar roll outs seated with physioball 1x8 1x10 (R, middle, L) 5 sec holds  1x10 (R, middle, L) 5 sec holds           Posterior pelvic tilts seated 1x8            Open book thoracic stretch              Mini squats  1x8 2x8 2x8 2x10 3x10 3x10       Step ups   2x8 B (no risers) 2x8 B (no risers)  2x10 B (no risers)  3x10 B (no risers)  3x10 B (no risers)        Quadruped cat camel  1x8 2x8 2x8          Quadruped opposite arm reach   1x8 1x8          Lateral step ups   Hold! Standing hip abduction  1x10 B  1x10 B 1x10 B  1x5 B        Standing hip extension   Hold! A-P pelvic tilts and side to side rocking on physioball     4' each  4' each 4' each  5' each                                                                                                                                Modalities                                                         Assessment: Tolerated treatment well  Therapeutic exercises besides warm-up on physioball were avoided as patient was nauseous and also had cold-like symptoms   Focus was on manual therapy for the lumbar spine and stretching of the lower extremities by the physical therapist  The patient had alleviated low back symptoms after manual therapy was provided  PT educated the patient to avoid rigorous activity as well as more difficult cardiovascular exercises until she feels better from her current sickness  Patient would benefit from continued PT to improve body mechanics, posture, strength, flexibility, and decrease symptoms in the low back to help with transition as patient is in her third trimester of pregnancy  Plan: Continue per plan of care

## 2019-02-19 ENCOUNTER — ROUTINE PRENATAL (OUTPATIENT)
Dept: OBGYN CLINIC | Facility: CLINIC | Age: 24
End: 2019-02-19
Payer: COMMERCIAL

## 2019-02-19 ENCOUNTER — ULTRASOUND (OUTPATIENT)
Dept: OBGYN CLINIC | Facility: CLINIC | Age: 24
End: 2019-02-19
Payer: COMMERCIAL

## 2019-02-19 VITALS
DIASTOLIC BLOOD PRESSURE: 85 MMHG | SYSTOLIC BLOOD PRESSURE: 120 MMHG | HEART RATE: 114 BPM | BODY MASS INDEX: 32.33 KG/M2 | HEIGHT: 67 IN | WEIGHT: 206 LBS

## 2019-02-19 DIAGNOSIS — Z36.89 ENCOUNTER FOR ULTRASOUND TO CHECK FETAL GROWTH: Primary | ICD-10-CM

## 2019-02-19 DIAGNOSIS — Z3A.35 35 WEEKS GESTATION OF PREGNANCY: Primary | ICD-10-CM

## 2019-02-19 PROCEDURE — 87653 STREP B DNA AMP PROBE: CPT | Performed by: OBSTETRICS & GYNECOLOGY

## 2019-02-19 PROCEDURE — 76816 OB US FOLLOW-UP PER FETUS: CPT

## 2019-02-19 PROCEDURE — 99213 OFFICE O/P EST LOW 20 MIN: CPT | Performed by: OBSTETRICS & GYNECOLOGY

## 2019-02-19 NOTE — PROGRESS NOTES
The patient is here for a routine OB visit  She has no complaints  She reports no contractions, vaginal bleeding or leakage of fluid  She reports good fetal movement  Growth US today shows EFW 45th percentile  GBS collected, NKDA  Follow-up in 1 week

## 2019-02-21 LAB — GP B STREP DNA SPEC QL NAA+PROBE: ABNORMAL

## 2019-02-22 ENCOUNTER — APPOINTMENT (OUTPATIENT)
Dept: PHYSICAL THERAPY | Facility: REHABILITATION | Age: 24
End: 2019-02-22
Payer: COMMERCIAL

## 2019-02-25 ENCOUNTER — APPOINTMENT (OUTPATIENT)
Dept: PHYSICAL THERAPY | Facility: REHABILITATION | Age: 24
End: 2019-02-25
Payer: COMMERCIAL

## 2019-02-28 ENCOUNTER — OFFICE VISIT (OUTPATIENT)
Dept: PHYSICAL THERAPY | Facility: REHABILITATION | Age: 24
End: 2019-02-28
Payer: COMMERCIAL

## 2019-02-28 DIAGNOSIS — O26.893 LOW BACK PAIN DURING PREGNANCY IN THIRD TRIMESTER: Primary | ICD-10-CM

## 2019-02-28 DIAGNOSIS — M54.50 LOW BACK PAIN DURING PREGNANCY IN THIRD TRIMESTER: Primary | ICD-10-CM

## 2019-02-28 PROCEDURE — 97140 MANUAL THERAPY 1/> REGIONS: CPT | Performed by: PHYSICAL THERAPIST

## 2019-02-28 PROCEDURE — 97110 THERAPEUTIC EXERCISES: CPT | Performed by: PHYSICAL THERAPIST

## 2019-02-28 NOTE — PROGRESS NOTES
PT Discharge     Today's date: 2019  Patient name: Jolly Lennox  : 1995  MRN: [id number]  Referring provider: Rose Navarrete MD  Dx: No diagnosis found  Patient has failed to report to physical therapy visits after this date  As a result of attendance policy violation, she is being formally discharged from physical therapy services  Subjective: Patient reports that she has been having more stomach pain and overall feeling "not as good" as her due date is approaching  She states that getting out of the bed the way she was taught by PT has helped her a lot, but when she starts to walk she starts to feel pain in her back and needs to sit down  Objective: See treatment diary below  Manual  2 8 2 14 2 15 2 18 2 28        IASTM lumbar spine bilaterally  SE SE SE SE SE        Prone quad stretch bilaterally  SE SE SE SE SE        Prone hip flexor stretch bilaterally   SE SE SE                                       Exercise Diary  1 28 2 4 2 7 2 8 2 14 2 15 2 18 2 28     Lumbar roll outs seated with physioball 1x8 1x10 (R, middle, L) 5 sec holds  1x10 (R, middle, L) 5 sec holds           Posterior pelvic tilts seated 1x8            Open book thoracic stretch              Mini squats  1x8 2x8 2x8 2x10 3x10 3x10  2x10     Step ups   2x8 B (no risers) 2x8 B (no risers)  2x10 B (no risers)  3x10 B (no risers)  3x10 B (no risers)   2x10     Quadruped cat camel  1x8 2x8 2x8          Quadruped opposite arm reach   1x8 1x8          Lateral step ups   Hold! Standing hip abduction  1x10 B  1x10 B 1x10 B  1x5 B   1x5 B     Standing hip extension   Hold! A-P pelvic tilts and side to side rocking on physioball     4' each  4' each 4' each  5' each  5' each                                                                                                                              Modalities                                                         Assessment: Tolerated treatment fair  She is much more symptomatic overall not so much related to her low back, but more so from her pregnancy, as she is approaching her due date month  She was unable to do any progressions of exercise, but did still go through mini squats, standing abduction, step ups, and pelvic tilting on the physioball  PT discussed with the patient the plan for tapering visits over the next 2 weeks before she will give birth, as well as the plan for starting therapy again after she gives birth to promote optimal function of movement  Patient was compliant throughout the session, and also is eager to continue physical therapy after she gives birth  Plan: Continue per plan of care

## 2019-03-26 ENCOUNTER — ULTRASOUND (OUTPATIENT)
Dept: OBGYN CLINIC | Facility: CLINIC | Age: 24
End: 2019-03-26
Payer: COMMERCIAL

## 2019-03-26 ENCOUNTER — ROUTINE PRENATAL (OUTPATIENT)
Dept: OBGYN CLINIC | Facility: CLINIC | Age: 24
End: 2019-03-26
Payer: COMMERCIAL

## 2019-03-26 VITALS
HEART RATE: 91 BPM | SYSTOLIC BLOOD PRESSURE: 120 MMHG | DIASTOLIC BLOOD PRESSURE: 86 MMHG | HEIGHT: 67 IN | BODY MASS INDEX: 33.74 KG/M2 | WEIGHT: 215 LBS

## 2019-03-26 DIAGNOSIS — Z36.89 ENCOUNTER FOR ULTRASOUND TO ASSESS FETAL GROWTH: Primary | ICD-10-CM

## 2019-03-26 DIAGNOSIS — Z3A.40 40 WEEKS GESTATION OF PREGNANCY: Primary | ICD-10-CM

## 2019-03-26 DIAGNOSIS — O48.0 POST TERM PREGNANCY OVER 40 WEEKS: ICD-10-CM

## 2019-03-26 PROCEDURE — 76815 OB US LIMITED FETUS(S): CPT | Performed by: OBSTETRICS & GYNECOLOGY

## 2019-03-26 PROCEDURE — 99213 OFFICE O/P EST LOW 20 MIN: CPT | Performed by: OBSTETRICS & GYNECOLOGY

## 2019-03-26 PROCEDURE — 59025 FETAL NON-STRESS TEST: CPT | Performed by: OBSTETRICS & GYNECOLOGY

## 2019-03-26 PROCEDURE — 76816 OB US FOLLOW-UP PER FETUS: CPT | Performed by: OBSTETRICS & GYNECOLOGY

## 2019-03-26 NOTE — PROGRESS NOTES
Patient is here for routine OB visit  Patient has not been seen since February 19, 2019  GBS is positive  NST is reactive  KATHARINE is 120  This was done due to postdates  She has 2/50/-2  Patient requesting elective induction of labor  She was scheduled for tomorrow at 1:00 p m  Natividad Juarez Patient with history of 9 lb 8 lb babies  Another growth scan was ordered today

## 2019-03-27 ENCOUNTER — HOSPITAL ENCOUNTER (OUTPATIENT)
Dept: LABOR AND DELIVERY | Facility: HOSPITAL | Age: 24
Discharge: HOME | DRG: 560 | End: 2019-03-27
Payer: COMMERCIAL

## 2019-03-27 ENCOUNTER — HOSPITAL ENCOUNTER (INPATIENT)
Facility: HOSPITAL | Age: 24
LOS: 2 days | Discharge: HOME | DRG: 560 | End: 2019-03-29
Attending: OBSTETRICS & GYNECOLOGY | Admitting: OBSTETRICS & GYNECOLOGY
Payer: COMMERCIAL

## 2019-03-27 DIAGNOSIS — Z3A.40 40 WEEKS GESTATION OF PREGNANCY: Primary | ICD-10-CM

## 2019-03-27 PROBLEM — Z34.90 ENCOUNTER FOR INDUCTION OF LABOR: Status: RESOLVED | Noted: 2019-03-27 | Resolved: 2019-03-27

## 2019-03-27 PROBLEM — M54.50 LOW BACK PAIN DURING PREGNANCY IN THIRD TRIMESTER: Status: RESOLVED | Noted: 2019-01-22 | Resolved: 2019-03-27

## 2019-03-27 PROBLEM — Z34.90 ENCOUNTER FOR INDUCTION OF LABOR: Status: ACTIVE | Noted: 2019-03-27

## 2019-03-27 PROBLEM — O26.893 LOW BACK PAIN DURING PREGNANCY IN THIRD TRIMESTER: Status: RESOLVED | Noted: 2019-01-22 | Resolved: 2019-03-27

## 2019-03-27 LAB
ABO GROUP BLD: NORMAL
BASE EXCESS BLDCOV CALC-SCNC: -3.7 MMOL/L (ref 1–9)
BASOPHILS # BLD AUTO: 0.05 THOUSANDS/ΜL (ref 0–0.1)
BASOPHILS NFR BLD AUTO: 1 % (ref 0–1)
BLD GP AB SCN SERPL QL: NEGATIVE
EOSINOPHIL # BLD AUTO: 0.05 THOUSAND/ΜL (ref 0–0.61)
EOSINOPHIL NFR BLD AUTO: 1 % (ref 0–6)
ERYTHROCYTE [DISTWIDTH] IN BLOOD BY AUTOMATED COUNT: 15.9 % (ref 11.6–15.1)
HCO3 BLDCOV-SCNC: 21.1 MMOL/L (ref 12.2–28.6)
HCT VFR BLD AUTO: 37.3 % (ref 34.8–46.1)
HGB BLD-MCNC: 11.2 G/DL (ref 11.5–15.4)
IMM GRANULOCYTES # BLD AUTO: 0.05 THOUSAND/UL (ref 0–0.2)
IMM GRANULOCYTES NFR BLD AUTO: 1 % (ref 0–2)
LYMPHOCYTES # BLD AUTO: 1.87 THOUSANDS/ΜL (ref 0.6–4.47)
LYMPHOCYTES NFR BLD AUTO: 18 % (ref 14–44)
MCH RBC QN AUTO: 23.8 PG (ref 26.8–34.3)
MCHC RBC AUTO-ENTMCNC: 30 G/DL (ref 31.4–37.4)
MCV RBC AUTO: 79 FL (ref 82–98)
MONOCYTES # BLD AUTO: 0.59 THOUSAND/ΜL (ref 0.17–1.22)
MONOCYTES NFR BLD AUTO: 6 % (ref 4–12)
NEUTROPHILS # BLD AUTO: 8.06 THOUSANDS/ΜL (ref 1.85–7.62)
NEUTS SEG NFR BLD AUTO: 73 % (ref 43–75)
NRBC BLD AUTO-RTO: 0 /100 WBCS
OXYHGB MFR BLDCOV: 68.8 %
PCO2 BLDCOV: 37.4 MM HG (ref 27–43)
PH BLDCOV: 7.37 [PH] (ref 7.19–7.49)
PLATELET # BLD AUTO: 308 THOUSANDS/UL (ref 149–390)
PMV BLD AUTO: 9.9 FL (ref 8.9–12.7)
PO2 BLDCOV: 28.6 MM HG (ref 15–45)
RBC # BLD AUTO: 4.7 MILLION/UL (ref 3.81–5.12)
RH BLD: POSITIVE
SAO2 % BLDCOV: 14.1 ML/DL
SPECIMEN EXPIRATION DATE: NORMAL
WBC # BLD AUTO: 10.67 THOUSAND/UL (ref 4.31–10.16)

## 2019-03-27 PROCEDURE — 86592 SYPHILIS TEST NON-TREP QUAL: CPT | Performed by: OBSTETRICS & GYNECOLOGY

## 2019-03-27 PROCEDURE — 10907ZC DRAINAGE OF AMNIOTIC FLUID, THERAPEUTIC FROM PRODUCTS OF CONCEPTION, VIA NATURAL OR ARTIFICIAL OPENING: ICD-10-PCS | Performed by: OBSTETRICS & GYNECOLOGY

## 2019-03-27 PROCEDURE — 85025 COMPLETE CBC W/AUTO DIFF WBC: CPT | Performed by: OBSTETRICS & GYNECOLOGY

## 2019-03-27 PROCEDURE — 59409 OBSTETRICAL CARE: CPT | Performed by: OBSTETRICS & GYNECOLOGY

## 2019-03-27 PROCEDURE — 86900 BLOOD TYPING SEROLOGIC ABO: CPT | Performed by: OBSTETRICS & GYNECOLOGY

## 2019-03-27 PROCEDURE — 3E033VJ INTRODUCTION OF OTHER HORMONE INTO PERIPHERAL VEIN, PERCUTANEOUS APPROACH: ICD-10-PCS | Performed by: OBSTETRICS & GYNECOLOGY

## 2019-03-27 PROCEDURE — 4A1HXCZ MONITORING OF PRODUCTS OF CONCEPTION, CARDIAC RATE, EXTERNAL APPROACH: ICD-10-PCS | Performed by: OBSTETRICS & GYNECOLOGY

## 2019-03-27 PROCEDURE — 82805 BLOOD GASES W/O2 SATURATION: CPT | Performed by: OBSTETRICS & GYNECOLOGY

## 2019-03-27 PROCEDURE — 86850 RBC ANTIBODY SCREEN: CPT | Performed by: OBSTETRICS & GYNECOLOGY

## 2019-03-27 PROCEDURE — 0UQMXZZ REPAIR VULVA, EXTERNAL APPROACH: ICD-10-PCS | Performed by: OBSTETRICS & GYNECOLOGY

## 2019-03-27 PROCEDURE — 86901 BLOOD TYPING SEROLOGIC RH(D): CPT | Performed by: OBSTETRICS & GYNECOLOGY

## 2019-03-27 RX ORDER — CALCIUM CARBONATE 200(500)MG
1000 TABLET,CHEWABLE ORAL DAILY PRN
Status: DISCONTINUED | OUTPATIENT
Start: 2019-03-27 | End: 2019-03-29 | Stop reason: HOSPADM

## 2019-03-27 RX ORDER — DOCUSATE SODIUM 100 MG/1
100 CAPSULE, LIQUID FILLED ORAL 2 TIMES DAILY
Status: DISCONTINUED | OUTPATIENT
Start: 2019-03-27 | End: 2019-03-29 | Stop reason: HOSPADM

## 2019-03-27 RX ORDER — DIPHENHYDRAMINE HCL 25 MG
25 TABLET ORAL EVERY 6 HOURS PRN
Status: DISCONTINUED | OUTPATIENT
Start: 2019-03-27 | End: 2019-03-29 | Stop reason: HOSPADM

## 2019-03-27 RX ORDER — DIAPER,BRIEF,INFANT-TODD,DISP
1 EACH MISCELLANEOUS AS NEEDED
Status: DISCONTINUED | OUTPATIENT
Start: 2019-03-27 | End: 2019-03-29 | Stop reason: HOSPADM

## 2019-03-27 RX ORDER — ONDANSETRON 2 MG/ML
4 INJECTION INTRAMUSCULAR; INTRAVENOUS EVERY 8 HOURS PRN
Status: DISCONTINUED | OUTPATIENT
Start: 2019-03-27 | End: 2019-03-29 | Stop reason: HOSPADM

## 2019-03-27 RX ORDER — ACETAMINOPHEN 325 MG/1
650 TABLET ORAL EVERY 6 HOURS PRN
Status: DISCONTINUED | OUTPATIENT
Start: 2019-03-27 | End: 2019-03-29 | Stop reason: HOSPADM

## 2019-03-27 RX ORDER — OXYCODONE HYDROCHLORIDE AND ACETAMINOPHEN 5; 325 MG/1; MG/1
2 TABLET ORAL EVERY 4 HOURS PRN
Status: DISCONTINUED | OUTPATIENT
Start: 2019-03-27 | End: 2019-03-29 | Stop reason: HOSPADM

## 2019-03-27 RX ORDER — IBUPROFEN 600 MG/1
600 TABLET ORAL EVERY 6 HOURS PRN
Status: DISCONTINUED | OUTPATIENT
Start: 2019-03-27 | End: 2019-03-29 | Stop reason: HOSPADM

## 2019-03-27 RX ORDER — OXYTOCIN/RINGER'S LACTATE 30/500 ML
1-30 PLASTIC BAG, INJECTION (ML) INTRAVENOUS
Status: DISCONTINUED | OUTPATIENT
Start: 2019-03-27 | End: 2019-03-27

## 2019-03-27 RX ORDER — OXYCODONE HYDROCHLORIDE AND ACETAMINOPHEN 5; 325 MG/1; MG/1
1 TABLET ORAL EVERY 4 HOURS PRN
Status: DISCONTINUED | OUTPATIENT
Start: 2019-03-27 | End: 2019-03-29 | Stop reason: HOSPADM

## 2019-03-27 RX ORDER — ONDANSETRON 2 MG/ML
4 INJECTION INTRAMUSCULAR; INTRAVENOUS EVERY 6 HOURS PRN
Status: DISCONTINUED | OUTPATIENT
Start: 2019-03-27 | End: 2019-03-27

## 2019-03-27 RX ORDER — SODIUM CHLORIDE, SODIUM LACTATE, POTASSIUM CHLORIDE, CALCIUM CHLORIDE 600; 310; 30; 20 MG/100ML; MG/100ML; MG/100ML; MG/100ML
125 INJECTION, SOLUTION INTRAVENOUS CONTINUOUS
Status: DISCONTINUED | OUTPATIENT
Start: 2019-03-27 | End: 2019-03-29 | Stop reason: HOSPADM

## 2019-03-27 RX ADMIN — BENZOCAINE AND LEVOMENTHOL 1 APPLICATION: 200; 5 SPRAY TOPICAL at 21:57

## 2019-03-27 RX ADMIN — SODIUM CHLORIDE, SODIUM LACTATE, POTASSIUM CHLORIDE, AND CALCIUM CHLORIDE 125 ML/HR: .6; .31; .03; .02 INJECTION, SOLUTION INTRAVENOUS at 15:32

## 2019-03-27 RX ADMIN — SODIUM CHLORIDE 5 MILLION UNITS: 0.9 INJECTION, SOLUTION INTRAVENOUS at 15:30

## 2019-03-27 RX ADMIN — Medication 2 MILLI-UNITS/MIN: at 16:51

## 2019-03-27 RX ADMIN — SODIUM CHLORIDE 2.5 MILLION UNITS: 9 INJECTION, SOLUTION INTRAVENOUS at 19:16

## 2019-03-27 RX ADMIN — IBUPROFEN 600 MG: 600 TABLET ORAL at 21:57

## 2019-03-27 RX ADMIN — WITCH HAZEL 1 PAD: 500 SOLUTION RECTAL; TOPICAL at 21:57

## 2019-03-28 LAB — RPR SER QL: NORMAL

## 2019-03-28 RX ADMIN — DOCUSATE SODIUM 100 MG: 100 CAPSULE, LIQUID FILLED ORAL at 08:34

## 2019-03-28 RX ADMIN — IBUPROFEN 600 MG: 600 TABLET ORAL at 10:45

## 2019-03-28 RX ADMIN — IBUPROFEN 600 MG: 600 TABLET ORAL at 22:17

## 2019-03-28 RX ADMIN — DOCUSATE SODIUM 100 MG: 100 CAPSULE, LIQUID FILLED ORAL at 18:24

## 2019-03-28 RX ADMIN — IBUPROFEN 600 MG: 600 TABLET ORAL at 04:26

## 2019-03-28 RX ADMIN — ACETAMINOPHEN 650 MG: 325 TABLET ORAL at 08:36

## 2019-03-29 VITALS
WEIGHT: 215 LBS | BODY MASS INDEX: 33.74 KG/M2 | SYSTOLIC BLOOD PRESSURE: 111 MMHG | HEIGHT: 67 IN | RESPIRATION RATE: 16 BRPM | DIASTOLIC BLOOD PRESSURE: 75 MMHG | TEMPERATURE: 98.8 F | HEART RATE: 62 BPM

## 2019-03-29 RX ORDER — DIAPER,BRIEF,INFANT-TODD,DISP
1 EACH MISCELLANEOUS AS NEEDED
Qty: 30 G | Refills: 0 | Status: CANCELLED
Start: 2019-03-29

## 2019-03-29 RX ORDER — DIPHENHYDRAMINE HCL 25 MG
25 TABLET ORAL EVERY 6 HOURS PRN
Qty: 30 TABLET | Refills: 0 | Status: CANCELLED
Start: 2019-03-29

## 2019-03-29 RX ORDER — DOCUSATE SODIUM 100 MG/1
100 CAPSULE, LIQUID FILLED ORAL 2 TIMES DAILY
Qty: 10 CAPSULE | Refills: 0 | Status: CANCELLED
Start: 2019-03-29

## 2019-03-29 RX ORDER — IBUPROFEN 600 MG/1
600 TABLET ORAL EVERY 6 HOURS PRN
Qty: 30 TABLET | Refills: 0 | Status: SHIPPED | OUTPATIENT
Start: 2019-03-29

## 2019-03-29 RX ORDER — ACETAMINOPHEN 325 MG/1
650 TABLET ORAL EVERY 6 HOURS PRN
Qty: 30 TABLET | Refills: 0 | Status: SHIPPED | OUTPATIENT
Start: 2019-03-29

## 2019-03-29 RX ORDER — CALCIUM CARBONATE 200(500)MG
1000 TABLET,CHEWABLE ORAL DAILY PRN
Refills: 0 | Status: CANCELLED
Start: 2019-03-29

## 2019-03-29 RX ADMIN — IBUPROFEN 600 MG: 600 TABLET ORAL at 08:10

## 2019-03-29 RX ADMIN — DOCUSATE SODIUM 100 MG: 100 CAPSULE, LIQUID FILLED ORAL at 08:10

## 2019-04-04 LAB — PLACENTA IN STORAGE: NORMAL

## 2019-04-05 ENCOUNTER — TELEPHONE (OUTPATIENT)
Dept: OBGYN CLINIC | Facility: CLINIC | Age: 24
End: 2019-04-05

## 2019-09-08 ENCOUNTER — OFFICE VISIT (OUTPATIENT)
Dept: URGENT CARE | Age: 24
End: 2019-09-08
Payer: COMMERCIAL

## 2019-09-08 VITALS
TEMPERATURE: 98.1 F | HEART RATE: 76 BPM | RESPIRATION RATE: 18 BRPM | SYSTOLIC BLOOD PRESSURE: 106 MMHG | DIASTOLIC BLOOD PRESSURE: 64 MMHG

## 2019-09-08 DIAGNOSIS — N30.00 ACUTE CYSTITIS WITHOUT HEMATURIA: Primary | ICD-10-CM

## 2019-09-08 LAB
SL AMB  POCT GLUCOSE, UA: ABNORMAL
SL AMB LEUKOCYTE ESTERASE,UA: ABNORMAL
SL AMB POCT BILIRUBIN,UA: ABNORMAL
SL AMB POCT BLOOD,UA: ABNORMAL
SL AMB POCT CLARITY,UA: ABNORMAL
SL AMB POCT COLOR,UA: ABNORMAL
SL AMB POCT KETONES,UA: ABNORMAL
SL AMB POCT NITRITE,UA: ABNORMAL
SL AMB POCT PH,UA: 7
SL AMB POCT SPECIFIC GRAVITY,UA: 1.01
SL AMB POCT URINE PROTEIN: 30
SL AMB POCT UROBILINOGEN: 0.2

## 2019-09-08 PROCEDURE — 87077 CULTURE AEROBIC IDENTIFY: CPT | Performed by: PHYSICIAN ASSISTANT

## 2019-09-08 PROCEDURE — 87086 URINE CULTURE/COLONY COUNT: CPT | Performed by: PHYSICIAN ASSISTANT

## 2019-09-08 PROCEDURE — 87147 CULTURE TYPE IMMUNOLOGIC: CPT | Performed by: PHYSICIAN ASSISTANT

## 2019-09-08 PROCEDURE — G0382 LEV 3 HOSP TYPE B ED VISIT: HCPCS | Performed by: PHYSICIAN ASSISTANT

## 2019-09-08 PROCEDURE — 99203 OFFICE O/P NEW LOW 30 MIN: CPT | Performed by: PHYSICIAN ASSISTANT

## 2019-09-08 PROCEDURE — 81002 URINALYSIS NONAUTO W/O SCOPE: CPT | Performed by: PHYSICIAN ASSISTANT

## 2019-09-08 PROCEDURE — 99283 EMERGENCY DEPT VISIT LOW MDM: CPT | Performed by: PHYSICIAN ASSISTANT

## 2019-09-08 RX ORDER — CEPHALEXIN 500 MG/1
500 CAPSULE ORAL EVERY 8 HOURS SCHEDULED
Qty: 21 CAPSULE | Refills: 0 | Status: SHIPPED | OUTPATIENT
Start: 2019-09-08 | End: 2019-09-15

## 2019-09-08 NOTE — PATIENT INSTRUCTIONS
Take medications as directed  Drink plenty of fluids  Follow up with family doctor this week  Go to ER immediately if new or worsening symptoms occur  Urinary Tract Infection in Women   WHAT YOU NEED TO KNOW:   A urinary tract infection (UTI) is caused by bacteria that get inside your urinary tract  Most bacteria that enter your urinary tract come out when you urinate  If the bacteria stay in your urinary tract, you may get an infection  Your urinary tract includes your kidneys, ureters, bladder, and urethra  Urine is made in your kidneys, and it flows from the ureters to the bladder  Urine leaves the bladder through the urethra  A UTI is more common in your lower urinary tract, which includes your bladder and urethra  DISCHARGE INSTRUCTIONS:   Return to the emergency department if:   · You are urinating very little or not at all  · You have a high fever with shaking chills  · You have side or back pain that gets worse  Contact your healthcare provider if:   · You have a fever  · You do not feel better after 2 days of taking antibiotics  · You are vomiting  · You have questions or concerns about your condition or care  Medicines:   · Antibiotics  help fight a bacterial infection  · Medicines  may be given to decrease pain and burning when you urinate  They will also help decrease the feeling that you need to urinate often  These medicines will make your urine orange or red  · Take your medicine as directed  Contact your healthcare provider if you think your medicine is not helping or if you have side effects  Tell him or her if you are allergic to any medicine  Keep a list of the medicines, vitamins, and herbs you take  Include the amounts, and when and why you take them  Bring the list or the pill bottles to follow-up visits  Carry your medicine list with you in case of an emergency    Follow up with your healthcare provider as directed:  Write down your questions so you remember to ask them during your visits  Prevent another UTI:   · Empty your bladder often  Urinate and empty your bladder as soon as you feel the need  Do not hold your urine for long periods of time  · Wipe from front to back after you urinate or have a bowel movement  This will help prevent germs from getting into your urinary tract through your urethra  · Drink liquids as directed  Ask how much liquid to drink each day and which liquids are best for you  You may need to drink more liquids than usual to help flush out the bacteria  Do not drink alcohol, caffeine, or citrus juices  These can irritate your bladder and increase your symptoms  Your healthcare provider may recommend cranberry juice to help prevent a UTI  · Urinate after you have sex  This can help flush out bacteria passed during sex  · Do not douche or use feminine deodorants  These can change the chemical balance in your vagina  · Change sanitary pads or tampons often  This will help prevent germs from getting into your urinary tract  · Do pelvic muscle exercises often  Pelvic muscle exercises may help you start and stop urinating  Strong pelvic muscles may help you empty your bladder easier  Squeeze these muscles tightly for 5 seconds like you are trying to hold back urine  Then relax for 5 seconds  Gradually work up to squeezing for 10 seconds  Do 3 sets of 15 repetitions a day, or as directed  © 2017 [employer] Information is for End User's use only and may not be sold, redistributed or otherwise used for commercial purposes  All illustrations and images included in CareNotes® are the copyrighted property of [employer]  or [employer]ath [employer]er  The above information is an  only  It is not intended as medical advice for individual conditions or treatments  Talk to your doctor, nurse or pharmacist before following any medical regimen to see if it is safe and effective for you

## 2019-09-08 NOTE — PROGRESS NOTES
[facility]        NAME: Mihai Kim is a 21 y o  female  : 1995    MRN: [id number]  DATE: 2019  TIME: 9:40 AM    Assessment and Plan   Acute cystitis without hematuria [N30 00]  1  Acute cystitis without hematuria  POCT urine dip    Urine culture    cephalexin (KEFLEX) 500 mg capsule         Patient Instructions       Take medications as directed  Drink plenty of fluids  Follow up with family doctor this week  Go to ER immediately if new or worsening symptoms occur  Chief Complaint     Chief Complaint   Patient presents with    Possible UTI     off and on 2 weeks frequency         History of Present Illness       Difficulty Urinating    This is a new problem  Episode onset: gradually worsening over 2 weeks  The pain is moderate  There has been no fever  Associated symptoms include frequency and urgency  Pertinent negatives include no chills, discharge, flank pain, nausea or vomiting  She has tried increased fluids for the symptoms  The treatment provided no relief  Review of Systems   Review of Systems   Constitutional: Negative  Negative for chills, fatigue and fever  HENT: Negative  Eyes: Negative  Respiratory: Negative  Cardiovascular: Negative  Gastrointestinal: Negative for abdominal pain, constipation, diarrhea, nausea and vomiting  Endocrine: Negative  Genitourinary: Positive for dysuria, frequency and urgency  Negative for flank pain, pelvic pain, vaginal bleeding, vaginal discharge and vaginal pain  Musculoskeletal: Negative for back pain, gait problem, neck pain and neck stiffness  Skin: Negative  Negative for pallor and rash  Allergic/Immunologic: Negative  Neurological: Negative  Negative for weakness and numbness  Hematological: Negative  Psychiatric/Behavioral: Negative            Current Medications       Current Outpatient Medications:     acetaminophen (TYLENOL) 325 mg tablet, Take 2 tablets (650 mg total) by mouth every 6 (six) hours as needed for headaches, Disp: 30 tablet, Rfl: 0    benzocaine-menthol-lanolin-aloe (DERMOPLAST) 20-0 5 % topical spray, Apply 1 application topically 4 (four) times a day as needed for mild pain, Disp: , Rfl: 0    cephalexin (KEFLEX) 500 mg capsule, Take 1 capsule (500 mg total) by mouth every 8 (eight) hours for 7 days, Disp: 21 capsule, Rfl: 0    ferrous sulfate 324 (65 Fe) mg, Take 1 tablet (324 mg total) by mouth daily before breakfast, Disp: 30 tablet, Rfl: 3    ibuprofen (MOTRIN) 600 mg tablet, Take 1 tablet (600 mg total) by mouth every 6 (six) hours as needed for mild pain, Disp: 30 tablet, Rfl: 0    witch hazel-glycerin (TUCKS) topical pad, Apply 1 pad topically as needed for irritation, Disp: , Rfl: 0    Current Allergies     Allergies as of 2019    (No Known Allergies)            The following portions of the patient's history were reviewed and updated as appropriate: allergies, current medications, past family history, past medical history, past social history, past surgical history and problem list      Past Medical History:   Diagnosis Date    Urinary tract infection        Past Surgical History:   Procedure Laterality Date    INDUCED          Family History   Problem Relation Age of Onset    No Known Problems Mother     No Known Problems Father          Medications have been verified  Objective   /64   Pulse 76   Temp 98 1 °F (36 7 °C)   Resp 18        Physical Exam     Physical Exam   Constitutional: She appears well-developed and well-nourished  No distress  HENT:   Head: Normocephalic and atraumatic  Cardiovascular: Normal rate, regular rhythm, normal heart sounds and intact distal pulses  Pulmonary/Chest: Effort normal and breath sounds normal  No respiratory distress  She has no wheezes  She has no rales  Abdominal: Soft  Bowel sounds are normal  She exhibits no distension  There is no tenderness  There is no guarding  Skin: Skin is warm  Capillary refill takes less than 2 seconds  No rash noted  She is not diaphoretic  No pallor  Nursing note and vitals reviewed

## 2019-09-10 LAB
BACTERIA UR CULT: ABNORMAL
BACTERIA UR CULT: ABNORMAL

## 2019-11-14 ENCOUNTER — HOSPITAL ENCOUNTER (EMERGENCY)
Facility: HOSPITAL | Age: 24
Discharge: HOME | End: 2019-11-14
Attending: EMERGENCY MEDICINE | Admitting: EMERGENCY MEDICINE
Payer: COMMERCIAL

## 2019-11-14 VITALS
TEMPERATURE: 98.4 F | SYSTOLIC BLOOD PRESSURE: 119 MMHG | WEIGHT: 196.65 LBS | RESPIRATION RATE: 18 BRPM | OXYGEN SATURATION: 100 % | HEART RATE: 80 BPM | BODY MASS INDEX: 30.8 KG/M2 | DIASTOLIC BLOOD PRESSURE: 81 MMHG

## 2019-11-14 DIAGNOSIS — S05.8X1A CORNEAL INJURY OF RIGHT EYE, INITIAL ENCOUNTER: ICD-10-CM

## 2019-11-14 DIAGNOSIS — H10.211 CHEMICAL CONJUNCTIVITIS OF RIGHT EYE: Primary | ICD-10-CM

## 2019-11-14 PROCEDURE — 99282 EMERGENCY DEPT VISIT SF MDM: CPT

## 2019-11-14 PROCEDURE — 99284 EMERGENCY DEPT VISIT MOD MDM: CPT | Performed by: PHYSICIAN ASSISTANT

## 2019-11-14 RX ORDER — ERYTHROMYCIN 5 MG/G
0.5 OINTMENT OPHTHALMIC EVERY 6 HOURS SCHEDULED
Qty: 3.5 G | Refills: 0 | Status: SHIPPED | OUTPATIENT
Start: 2019-11-14 | End: 2019-11-19

## 2019-11-14 RX ORDER — KETOROLAC TROMETHAMINE 5 MG/ML
1 SOLUTION OPHTHALMIC EVERY 6 HOURS PRN
Qty: 5 ML | Refills: 0 | Status: SHIPPED | OUTPATIENT
Start: 2019-11-14

## 2019-11-14 RX ORDER — TETRACAINE HYDROCHLORIDE 5 MG/ML
2 SOLUTION OPHTHALMIC ONCE
Status: COMPLETED | OUTPATIENT
Start: 2019-11-14 | End: 2019-11-14

## 2019-11-14 RX ADMIN — FLUORESCEIN SODIUM 1 STRIP: 1 STRIP OPHTHALMIC at 12:38

## 2019-11-14 RX ADMIN — TETRACAINE HYDROCHLORIDE 2 DROP: 5 SOLUTION OPHTHALMIC at 12:38

## 2019-11-14 NOTE — DISCHARGE INSTRUCTIONS
Conjunctivitis   WHAT YOU NEED TO KNOW:   Conjunctivitis, or pink eye, is inflammation of your conjunctiva  The conjunctiva is a thin tissue that covers the front of your eye and the back of your eyelids  The conjunctiva helps protect your eye and keep it moist  Conjunctivitis may be caused by bacteria, allergies, or a virus  If your conjunctivitis is caused by bacteria, it may get better on its own in about 7 days  Viral conjunctivitis can last up to 3 weeks  DISCHARGE INSTRUCTIONS:   Return to the emergency department if:   · You have worsening eye pain  · The swelling in your eye gets worse, even after treatment  · Your vision suddenly becomes worse or you cannot see at all  Contact your healthcare provider if:   · You develop a fever and ear pain  · You have tiny bumps or spots of blood on your eye  · You have questions or concerns about your condition or care  Manage your symptoms:   · Apply a cool compress  Wet a washcloth with cold water and place it on your eye  This will help decrease itching and irritation  · Do not wear contact lenses  They can irritate your eye  Throw away the pair you are using and ask when you can wear them again  Use a new pair of lenses when your healthcare provider says it is okay  · Avoid irritants  Stay away from smoke filled areas  Shield your eyes from wind and sun  · Flush your eye  You may need to flush your eye with saline to help decrease your symptoms  Ask for more information on how to flush your eye  Medicines:  Treatment depends on what is causing your conjunctivitis  You may be given any of the following:  · Allergy medicine  helps decrease itchy, red, swollen eyes caused by allergies  It may be given as a pill, eye drops, or nasal spray  · Antibiotics  may be needed if your conjunctivitis is caused by bacteria  This medicine may be given as a pill, eye drops, or eye ointment  · Take your medicine as directed  Contact your healthcare provider if you think your medicine is not helping or if you have side effects  Tell him or her if you are allergic to any medicine  Keep a list of the medicines, vitamins, and herbs you take  Include the amounts, and when and why you take them  Bring the list or the pill bottles to follow-up visits  Carry your medicine list with you in case of an emergency  Prevent the spread of conjunctivitis:   · Wash your hands with soap and water often  Wash your hands before and after you touch your eyes  Also wash your hands before you prepare or eat food and after you use the bathroom or change a diaper  · Avoid allergens  Try to avoid the things that cause your allergies, such as pets, dust, or grass  · Avoid contact with others  Do not share towels or washcloths  Try to stay away from others as much as possible  Ask when you can return to work or school  · Throw away eye makeup  The bacteria that caused your conjunctivitis can stay in eye makeup  Throw away mascara and other eye makeup  © 2017 [address] [facility] Information is for End User's use only and may not be sold, redistributed or otherwise used for commercial purposes  All illustrations and images included in CareNotes® are the copyrighted property of [employer] Inc  or [employer]ath Comer  The above information is an  only  It is not intended as medical advice for individual conditions or treatments  Talk to your doctor, nurse or pharmacist before following any medical regimen to see if it is safe and effective for you  Corneal Abrasion   WHAT YOU NEED TO KNOW:   A corneal abrasion is a scratch on the cornea of your eye  The cornea is the clear layer that covers the front of your eye  A small scratch may heal in 1 to 2 days  Deeper or larger scratches may take longer to heal         DISCHARGE INSTRUCTIONS:   Contact your healthcare provider if:   · Your eye pain or vision gets worse  · You have yellow or green drainage from your eye  · You have questions or concerns about your condition or care  Medicines:   · Medicines  may be given in the form of eyedrops or ointment to help prevent an eye infection  You may also be given eye drops to decrease pain  Ask how to take this medicine safely  · Take your medicine as directed  Contact your healthcare provider if you think your medicine is not helping or if you have side effects  Tell him or her if you are allergic to any medicine  Keep a list of the medicines, vitamins, and herbs you take  Include the amounts, and when and why you take them  Bring the list or the pill bottles to follow-up visits  Carry your medicine list with you in case of an emergency  Follow up with your healthcare provider as directed:  Write down your questions so you remember to ask them during your visits  Self-care:   · Do not touch or rub your eye  · Ask your healthcare provider when you can start your normal activities  · Ask your healthcare provider when you can wear your contact lenses  · Wear sunglasses in bright light until your eyes feel better  Help prevent corneal abrasions:   · Remove your contact lenses if your eyes feel dry or irritated  · Wash your hands if you need to touch your eyes or your face  · Trim your child's fingernails so he cannot scratch his eye  · Wear protective eyewear when you work with chemicals, wood, dust, or metal      · Wear protective eyewear when you play sports  · Do not wear your contacts for longer than you should  · Do not wear colored lenses or lenses with shapes on them  These lenses may cause eye damage and vision loss  · Do not wear glitter makeup  Glitter can easily get into your eyes and under contact lenses  · Do not sleep with your contacts in your eyes    © 2017 [employer] Information is for End User's use only and may not be sold, redistributed or otherwise used for commercial purposes  All illustrations and images included in CareNotes® are the copyrighted property of [employer]  or [employer]ath Comer  The above information is an  only  It is not intended as medical advice for individual conditions or treatments  Talk to your doctor, nurse or pharmacist before following any medical regimen to see if it is safe and effective for you

## 2019-11-14 NOTE — ED PROVIDER NOTES
History  Chief Complaint   Patient presents with    Eye Redness     Reports getting a chemical in right eye last night  Reports blurred vision and pain to eye  Benson Figueroa is a 21 y o  female who presents to the ED with complaints of stay right eye pain, eye redness and blurred vision x2 days  Patient reports she is doing her hair yesterday when she accidentally got the loosening chemical into her right eye  Patient states she has been flushing the eye with cold water without alleviation of pain  Patient states she wears glasses at baseline but did not bring her glasses today  Denies contact lens use, photophobia, nausea, vomiting, headache, eye discharge, pain with extraocular movement, eyelid swelling, chest pain, shortness of breath, fever, chills  History provided by:  Patient  Eye Problem   Location:  Right eye  Quality:  Stinging  Duration:  2 days  Context: chemical exposure    Associated symptoms: blurred vision, itching and redness    Associated symptoms: no crusting, no decreased vision, no discharge, no double vision, no facial rash, no headaches, no inflammation, no nausea, no numbness, no photophobia, no scotomas, no swelling, no tearing, no tingling, no vomiting and no weakness        Prior to Admission Medications   Prescriptions Last Dose Informant Patient Reported?  Taking?   acetaminophen (TYLENOL) 325 mg tablet   No No   Sig: Take 2 tablets (650 mg total) by mouth every 6 (six) hours as needed for headaches   benzocaine-menthol-lanolin-aloe (DERMOPLAST) 20-0 5 % topical spray   No No   Sig: Apply 1 application topically 4 (four) times a day as needed for mild pain   ferrous sulfate 324 (65 Fe) mg   No No   Sig: Take 1 tablet (324 mg total) by mouth daily before breakfast   ibuprofen (MOTRIN) 600 mg tablet   No No   Sig: Take 1 tablet (600 mg total) by mouth every 6 (six) hours as needed for mild pain   witch hazel-glycerin (TUCKS) topical pad   No No   Sig: Apply 1 pad topically as needed for irritation      Facility-Administered Medications: None       Past Medical History:   Diagnosis Date    Urinary tract infection        Past Surgical History:   Procedure Laterality Date    INDUCED          Family History   Problem Relation Age of Onset    No Known Problems Mother     No Known Problems Father      I have reviewed and agree with the history as documented  Social History     Tobacco Use    Smoking status: Never Smoker    Smokeless tobacco: Never Used   Substance Use Topics    Alcohol use: No    Drug use: No        Review of Systems   Constitutional: Negative for appetite change, chills, fever and unexpected weight change  HENT: Negative for congestion, drooling, ear pain, rhinorrhea, sore throat, trouble swallowing and voice change  Eyes: Positive for blurred vision, pain, redness and itching  Negative for double vision, photophobia, discharge and visual disturbance  Respiratory: Negative for cough, shortness of breath, wheezing and stridor  Cardiovascular: Negative for chest pain, palpitations and leg swelling  Gastrointestinal: Negative for abdominal pain, blood in stool, constipation, diarrhea, nausea and vomiting  Genitourinary: Negative for dysuria, flank pain, frequency, hematuria and urgency  Musculoskeletal: Negative for gait problem, joint swelling, neck pain and neck stiffness  Skin: Negative for color change and rash  Neurological: Negative for dizziness, tingling, seizures, weakness, light-headedness, numbness and headaches  Physical Exam  Physical Exam   Constitutional: She is oriented to person, place, and time  She appears well-developed and well-nourished  HENT:   Head: Normocephalic and atraumatic  Nose: Nose normal    Mouth/Throat: Oropharynx is clear and moist    Eyes: Pupils are equal, round, and reactive to light  EOM and lids are normal  Lids are everted and swept, no foreign bodies found   Right conjunctiva is injected  Injection of the conjunctivae of the nasal aspect of the right eye  No visualized foreign body  No pain with extraocular movement  Negative Rosana sign  Small area of fluorescein uptake at the 6 o'clock position overlying the iris border  Cardiovascular: Normal rate, regular rhythm and intact distal pulses  Pulmonary/Chest: Effort normal and breath sounds normal    Musculoskeletal: Normal range of motion  Neurological: She is alert and oriented to person, place, and time  Skin: Skin is warm and dry  Capillary refill takes less than 2 seconds  Psychiatric: She has a normal mood and affect  Nursing note and vitals reviewed  Vital Signs  ED Triage Vitals [11/14/19 1230]   Temperature Pulse Respirations Blood Pressure SpO2   98 4 °F (36 9 °C) 80 18 119/81 100 %      Temp Source Heart Rate Source Patient Position - Orthostatic VS BP Location FiO2 (%)   Temporal Monitor Sitting Right arm --      Pain Score       --           Vitals:    11/14/19 1230   BP: 119/81   Pulse: 80   Patient Position - Orthostatic VS: Sitting         Visual Acuity      ED Medications  Medications   fluorescein sodium sterile ophthalmic strip 1 strip (1 strip Right Eye Given by Other 11/14/19 1238)   tetracaine 0 5 % ophthalmic solution 2 drop (2 drops Right Eye Given by Other 11/14/19 1238)       Diagnostic Studies  Results Reviewed     None                 No orders to display              Procedures  Procedures       ED Course                               MDM  Number of Diagnoses or Management Options  Chemical conjunctivitis of right eye: new and does not require workup  Corneal injury of right eye, initial encounter:   Diagnosis management comments: Sophia Sanchez is a 21 y o  female who presents to the ED with complaints of stay right eye pain, eye redness and blurred vision x2 days  Patient reports she is doing her hair yesterday when she accidentally got the loosening chemical into her right eye  Patient states she has been flushing the eye with cold water without alleviation of pain  Patient states she wears glasses at baseline but did not bring her glasses today  Denies contact lens use, photophobia, nausea, vomiting, headache, eye discharge, pain with extraocular movement, eyelid swelling, chest pain, shortness of breath, fever, chills  Physical examination with injected conjunctiva and small area of corneal uptake consistent with possible corneal injury  Patient tolerated flushing  Patient was placed on erythromycin ointment and advised to follow up with outpatient ophthalmology  I provided patient with strict RTER precautions  I advised patient follow-up with PCP in 24-48 hours  Patient verbalized understanding  Amount and/or Complexity of Data Reviewed  Review and summarize past medical records: yes    Risk of Complications, Morbidity, and/or Mortality  Presenting problems: moderate  Diagnostic procedures: moderate  Management options: low    Patient Progress  Patient progress: improved      Disposition  Final diagnoses:   Chemical conjunctivitis of right eye   Corneal injury of right eye, initial encounter     Time reflects when diagnosis was documented in both MDM as applicable and the Disposition within this note     Time User Action Codes Description Comment    11/14/2019 12:37 PM Jerry Hummel [G33 [id number]] Chemical conjunctivitis of right eye     11/14/2019 12:48 PM Sasha Zamudio 37 Corneal injury of right eye, initial encounter       ED Disposition     ED Disposition Condition Date/Time Comment    Discharge Stable Thu Nov 14, 2019 63:63 PM Delfin Montano discharge to home/self care              Follow-up Information     Follow up With Specialties Details Why Contact Info Additional [address] [facility] Emergency Department Emergency Medicine Go to  If symptoms worsen [place] [id number] [phone] [place] ED, [address] [facility] , [address] [place] [address], [address] [place] [address] Yohan Corrales MD Family Medicine Schedule an appointment as soon as possible for a visit   [address]  [address] [facility] [address]  [phone]       [address] Ophthalmology Schedule an appointment as soon as possible for a visit   [address] [facility] [address]  [phone]             Patient's Medications   Discharge Prescriptions    ERYTHROMYCIN (ILOTYCIN) OPHTHALMIC OINTMENT    Administer 0 5 inches to the right eye every 6 (six) hours for 5 days Place a 1/2 inch ribbon of ointment into the lower eyelid  Start Date: 11/14/2019End Date: 11/19/2019       Order Dose: 0 5 inches       Quantity: 3 5 g    Refills: 0    KETOROLAC (ACULAR) 0 5 % OPHTHALMIC SOLUTION    Administer 1 drop to the right eye every 6 (six) hours as needed (Eye Pain)       Start Date: 11/14/2019End Date: --       Order Dose: 1 drop       Quantity: 5 mL    Refills: 0     No discharge procedures on file      ED Provider  Electronically Signed by           Suad Daugherty PA-C  11/14/19 9631

## 2020-01-16 ENCOUNTER — OFFICE VISIT (OUTPATIENT)
Dept: URGENT CARE | Age: 25
End: 2020-01-16
Payer: COMMERCIAL

## 2020-01-16 VITALS
BODY MASS INDEX: 29.51 KG/M2 | WEIGHT: 188 LBS | HEART RATE: 86 BPM | RESPIRATION RATE: 18 BRPM | OXYGEN SATURATION: 99 % | SYSTOLIC BLOOD PRESSURE: 114 MMHG | HEIGHT: 67 IN | DIASTOLIC BLOOD PRESSURE: 58 MMHG | TEMPERATURE: 98.4 F

## 2020-01-16 DIAGNOSIS — A08.4 VIRAL GASTROENTERITIS: Primary | ICD-10-CM

## 2020-01-16 PROCEDURE — 99213 OFFICE O/P EST LOW 20 MIN: CPT | Performed by: NURSE PRACTITIONER

## 2020-01-16 PROCEDURE — G0382 LEV 3 HOSP TYPE B ED VISIT: HCPCS | Performed by: NURSE PRACTITIONER

## 2020-01-16 PROCEDURE — 99283 EMERGENCY DEPT VISIT LOW MDM: CPT | Performed by: NURSE PRACTITIONER

## 2020-01-16 RX ORDER — ONDANSETRON 4 MG/1
4 TABLET, ORALLY DISINTEGRATING ORAL EVERY 6 HOURS PRN
Qty: 20 TABLET | Refills: 0 | Status: SHIPPED | OUTPATIENT
Start: 2020-01-16

## 2020-01-16 NOTE — PATIENT INSTRUCTIONS
Gastroenteritis   AMBULATORY CARE:   Gastroenteritis , or stomach flu, is an infection of the stomach and intestines  It is caused by bacteria, parasites, or viruses  Common symptoms include the following:   · Diarrhea or gas    · Nausea, vomiting, or poor appetite    · Abdominal cramps, pain, or gurgling    · Fever    · Tiredness or weakness    · Headaches or muscle aches with any of the above symptoms  Call 911 for any of the following:   · You have trouble breathing or a very fast pulse  Seek care immediately if:   · You see blood in your diarrhea  · You cannot stop vomiting  · You have not urinated for 12 hours  · You feel like you are going to faint  Contact your healthcare provider if:   · You have a fever  · You continue to vomit or have diarrhea, even after treatment  · You see worms in your diarrhea  · Your mouth or eyes are dry  You are not urinating as much or as often  · You have questions or concerns about your condition or care  Treatment for gastroenteritis  may include medicines to slow or stop your diarrhea or vomiting  You may also need medicines to treat an infection caused by bacteria or a parasite  Manage your symptoms:   · Drink liquids as directed  Ask your healthcare provider how much liquid to drink each day, and which liquids are best for you  You may also need to drink an oral rehydration solution (ORS)  An ORS has the right amounts of sugar, salt, and minerals in water to replace body fluids  · Eat bland foods  When you feel hungry, begin eating soft, bland foods  Examples are bananas, clear soup, potatoes, and applesauce  Do not have dairy products, alcohol, sugary drinks, or drinks with caffeine until you feel better  · Rest as much as possible  Slowly start to do more each day when you begin to feel better  Prevent the spread of germs:  Gastroenteritis can spread easily   Keep yourself, your family, and your surroundings clean to help prevent the spread of gastroenteritis:  · Wash your hands often  Use soap and water  Wash your hands after you use the bathroom, change a child's diapers, or sneeze  Wash your hands before you prepare or eat food  · Clean surfaces and do laundry often  Wash your clothes and towels separately from the rest of the laundry  Clean surfaces in your home with antibacterial  or bleach  · Clean food thoroughly and cook safely  Wash raw vegetables before you cook  Cook meat, fish, and eggs fully  Do not use the same dishes for raw meat as you do for other foods  Refrigerate any leftover food immediately  · Be aware when you camp or travel  Drink only clean water  Do not drink from rivers or lakes unless you purify or boil the water first  When you travel, drink bottled water and do not add ice  Do not eat fruit that has not been peeled  Do not eat raw fish or meat that is not fully cooked  Follow up with your healthcare provider as directed:  Write down your questions so you remember to ask them during your visits  © 2017 [facility] [facility] Information is for End User's use only and may not be sold, redistributed or otherwise used for commercial purposes  All illustrations and images included in CareNotes® are the copyrighted property of A D A M , Inc  or Sharath Comer  The above information is an  only  It is not intended as medical advice for individual conditions or treatments  Talk to your doctor, nurse or pharmacist before following any medical regimen to see if it is safe and effective for you

## 2020-01-16 NOTE — PROGRESS NOTES
[facility]        NAME: Delfin Montano is a 25 y o  female  : 1995    MRN: [id number]  DATE: 2020  TIME: 9:45 AM    Assessment and Plan   Viral gastroenteritis [A08 4]  1  Viral gastroenteritis       Start zofran prn  Reviewed BRAT diet  F/u with pcp     Patient Instructions     Follow up with PCP in 3-5 days  Proceed to  ER if symptoms worsen  Chief Complaint     Chief Complaint   Patient presents with    Abdominal Pain     complains of cramping, nausea, loose stools since this am         History of Present Illness   Delfin Montano presents to the clinic c/o    Pt states woke up at 6 am this morning with some nausea, sour stomach, and stomach cramps  Diarrhea started first then started with vomiting  Picked up gatorade and saltines -   Tried to eat and drink but unable to do so  No one else at home sick - no known sick contacts at work  Review of Systems   Review of Systems   All other systems reviewed and are negative          Current Medications     Long-Term Medications   Medication Sig Dispense Refill    benzocaine-menthol-lanolin-aloe (DERMOPLAST) 20-0 5 % topical spray Apply 1 application topically 4 (four) times a day as needed for mild pain (Patient not taking: Reported on 2020)  0    ferrous sulfate 324 (65 Fe) mg Take 1 tablet (324 mg total) by mouth daily before breakfast (Patient not taking: Reported on 2020) 30 tablet 3    ibuprofen (MOTRIN) 600 mg tablet Take 1 tablet (600 mg total) by mouth every 6 (six) hours as needed for mild pain (Patient not taking: Reported on 2020) 30 tablet 0    ketorolac (ACULAR) 0 5 % ophthalmic solution Administer 1 drop to the right eye every 6 (six) hours as needed (Eye Pain) (Patient not taking: Reported on 2020) 5 mL 0    witch hazel-glycerin (TUCKS) topical pad Apply 1 pad topically as needed for irritation (Patient not taking: Reported on 2020)  0       Current Allergies Allergies as of 01/16/2020    (No Known Allergies)            The following portions of the patient's history were reviewed and updated as appropriate: allergies, current medications, past family history, past medical history, past social history, past surgical history and problem list     Objective   /58   Pulse 86   Temp 98 4 °F (36 9 °C)   Resp 18   Ht 5' 7" (1 702 m)   Wt 85 3 kg (188 lb)   LMP 01/10/2020   SpO2 99%   BMI 29 44 kg/m²        Physical Exam     Physical Exam   Constitutional: She is oriented to person, place, and time  Vital signs are normal  She appears well-developed and well-nourished  She is active and cooperative  HENT:   Head: Normocephalic and atraumatic  Eyes: Conjunctivae and lids are normal    Cardiovascular: Normal rate, regular rhythm, S1 normal, S2 normal and normal heart sounds  Pulmonary/Chest: Effort normal and breath sounds normal    Abdominal: Soft  Normal appearance  Bowel sounds are increased  There is no hepatosplenomegaly  There is generalized tenderness  No hernia  Neurological: She is alert and oriented to person, place, and time  Skin: Skin is warm and dry  Psychiatric: She has a normal mood and affect  Her speech is normal and behavior is normal  Judgment and thought content normal  Cognition and memory are normal    Nursing note and vitals reviewed

## 2020-01-16 NOTE — LETTER
January 16, 2020     Patient: Svitlana Nurse   YOB: 1995   Date of Visit: 1/16/2020       To Whom it May Concern:    Ann Marie Dudley was seen in my clinic on 1/16/2020  She may return to work on 1/20/2020  If you have any questions or concerns, please don't hesitate to call           Sincerely,          ONEAL Martin        CC: No Recipients

## 2020-06-06 ENCOUNTER — OFFICE VISIT (OUTPATIENT)
Dept: URGENT CARE | Age: 25
End: 2020-06-06
Payer: COMMERCIAL

## 2020-06-06 VITALS
DIASTOLIC BLOOD PRESSURE: 68 MMHG | OXYGEN SATURATION: 100 % | RESPIRATION RATE: 18 BRPM | SYSTOLIC BLOOD PRESSURE: 107 MMHG | HEART RATE: 89 BPM | TEMPERATURE: 99 F

## 2020-06-06 DIAGNOSIS — Z20.2 EXPOSURE TO STD: Primary | ICD-10-CM

## 2020-06-06 LAB
SL AMB  POCT GLUCOSE, UA: NEGATIVE
SL AMB LEUKOCYTE ESTERASE,UA: ABNORMAL
SL AMB POCT BILIRUBIN,UA: NEGATIVE
SL AMB POCT BLOOD,UA: NEGATIVE
SL AMB POCT CLARITY,UA: CLEAR
SL AMB POCT COLOR,UA: YELLOW
SL AMB POCT KETONES,UA: NEGATIVE
SL AMB POCT NITRITE,UA: NEGATIVE
SL AMB POCT PH,UA: 6
SL AMB POCT SPECIFIC GRAVITY,UA: 1.02
SL AMB POCT URINE HCG: NEGATIVE
SL AMB POCT URINE PROTEIN: NEGATIVE
SL AMB POCT UROBILINOGEN: 0.2

## 2020-06-06 PROCEDURE — 81002 URINALYSIS NONAUTO W/O SCOPE: CPT | Performed by: PHYSICIAN ASSISTANT

## 2020-06-06 PROCEDURE — 87077 CULTURE AEROBIC IDENTIFY: CPT | Performed by: PHYSICIAN ASSISTANT

## 2020-06-06 PROCEDURE — 87591 N.GONORRHOEAE DNA AMP PROB: CPT | Performed by: PHYSICIAN ASSISTANT

## 2020-06-06 PROCEDURE — 87186 SC STD MICRODIL/AGAR DIL: CPT | Performed by: PHYSICIAN ASSISTANT

## 2020-06-06 PROCEDURE — 81025 URINE PREGNANCY TEST: CPT | Performed by: PHYSICIAN ASSISTANT

## 2020-06-06 PROCEDURE — 87491 CHLMYD TRACH DNA AMP PROBE: CPT | Performed by: PHYSICIAN ASSISTANT

## 2020-06-06 PROCEDURE — G0382 LEV 3 HOSP TYPE B ED VISIT: HCPCS | Performed by: PHYSICIAN ASSISTANT

## 2020-06-06 PROCEDURE — 99213 OFFICE O/P EST LOW 20 MIN: CPT | Performed by: PHYSICIAN ASSISTANT

## 2020-06-06 PROCEDURE — 87086 URINE CULTURE/COLONY COUNT: CPT | Performed by: PHYSICIAN ASSISTANT

## 2020-06-06 PROCEDURE — 99283 EMERGENCY DEPT VISIT LOW MDM: CPT | Performed by: PHYSICIAN ASSISTANT

## 2020-06-08 LAB
BACTERIA UR CULT: ABNORMAL
C TRACH DNA SPEC QL NAA+PROBE: POSITIVE
N GONORRHOEA DNA SPEC QL NAA+PROBE: NEGATIVE

## 2020-06-09 ENCOUNTER — TELEPHONE (OUTPATIENT)
Dept: URGENT CARE | Facility: CLINIC | Age: 25
End: 2020-06-09

## 2020-06-09 DIAGNOSIS — N39.0 URINARY TRACT INFECTION WITHOUT HEMATURIA, SITE UNSPECIFIED: ICD-10-CM

## 2020-06-09 DIAGNOSIS — A64 STD (FEMALE): Primary | ICD-10-CM

## 2020-06-09 RX ORDER — DOXYCYCLINE 100 MG/1
100 TABLET ORAL 2 TIMES DAILY
Qty: 14 TABLET | Refills: 0 | Status: SHIPPED | OUTPATIENT
Start: 2020-06-09 | End: 2020-06-16

## 2020-06-09 RX ORDER — CEFIXIME 400 MG/1
400 CAPSULE ORAL DAILY
Qty: 1 CAPSULE | Refills: 0 | Status: SHIPPED | OUTPATIENT
Start: 2020-06-09 | End: 2020-06-09 | Stop reason: RX

## 2020-06-11 ENCOUNTER — TELEPHONE (OUTPATIENT)
Dept: URGENT CARE | Age: 25
End: 2020-06-11

## 2020-11-04 ENCOUNTER — HOSPITAL ENCOUNTER (EMERGENCY)
Facility: HOSPITAL | Age: 25
Discharge: HOME | End: 2020-11-04
Attending: EMERGENCY MEDICINE | Admitting: EMERGENCY MEDICINE
Payer: COMMERCIAL

## 2020-11-04 VITALS
TEMPERATURE: 97.9 F | SYSTOLIC BLOOD PRESSURE: 128 MMHG | OXYGEN SATURATION: 98 % | RESPIRATION RATE: 16 BRPM | DIASTOLIC BLOOD PRESSURE: 73 MMHG | HEART RATE: 81 BPM

## 2020-11-04 DIAGNOSIS — N89.8 VAGINAL DISCHARGE: ICD-10-CM

## 2020-11-04 DIAGNOSIS — Z20.2 POSSIBLE EXPOSURE TO STD: Primary | ICD-10-CM

## 2020-11-04 LAB
BACTERIA UR QL AUTO: ABNORMAL /HPF
BILIRUB UR QL STRIP: NEGATIVE
CLARITY UR: CLEAR
COLOR UR: YELLOW
EXT PREG TEST URINE: NEGATIVE
EXT. CONTROL ED NAV: NORMAL
GLUCOSE UR STRIP-MCNC: NEGATIVE MG/DL
HGB UR QL STRIP.AUTO: NEGATIVE
KETONES UR STRIP-MCNC: NEGATIVE MG/DL
LEUKOCYTE ESTERASE UR QL STRIP: ABNORMAL
NITRITE UR QL STRIP: NEGATIVE
NON-SQ EPI CELLS URNS QL MICRO: ABNORMAL /HPF
PH UR STRIP.AUTO: 7 [PH] (ref 4.5–8)
PROT UR STRIP-MCNC: NEGATIVE MG/DL
RBC #/AREA URNS AUTO: ABNORMAL /HPF
SP GR UR STRIP.AUTO: 1.02 (ref 1–1.03)
UROBILINOGEN UR QL STRIP.AUTO: 1 E.U./DL
WBC #/AREA URNS AUTO: ABNORMAL /HPF

## 2020-11-04 PROCEDURE — 87591 N.GONORRHOEAE DNA AMP PROB: CPT | Performed by: PHYSICIAN ASSISTANT

## 2020-11-04 PROCEDURE — 96372 THER/PROPH/DIAG INJ SC/IM: CPT

## 2020-11-04 PROCEDURE — 99284 EMERGENCY DEPT VISIT MOD MDM: CPT | Performed by: PHYSICIAN ASSISTANT

## 2020-11-04 PROCEDURE — 81001 URINALYSIS AUTO W/SCOPE: CPT

## 2020-11-04 PROCEDURE — 99283 EMERGENCY DEPT VISIT LOW MDM: CPT

## 2020-11-04 PROCEDURE — 81025 URINE PREGNANCY TEST: CPT | Performed by: PHYSICIAN ASSISTANT

## 2020-11-04 PROCEDURE — 87491 CHLMYD TRACH DNA AMP PROBE: CPT | Performed by: PHYSICIAN ASSISTANT

## 2020-11-04 RX ORDER — AZITHROMYCIN 250 MG/1
1000 TABLET, FILM COATED ORAL ONCE
Status: COMPLETED | OUTPATIENT
Start: 2020-11-04 | End: 2020-11-04

## 2020-11-04 RX ORDER — METRONIDAZOLE 7.5 MG/G
1 GEL VAGINAL 2 TIMES DAILY
Qty: 70 G | Refills: 0 | Status: SHIPPED | OUTPATIENT
Start: 2020-11-04 | End: 2020-11-09

## 2020-11-04 RX ADMIN — AZITHROMYCIN MONOHYDRATE 1000 MG: 250 TABLET ORAL at 09:02

## 2020-11-04 RX ADMIN — LIDOCAINE HYDROCHLORIDE 250 MG: 10 INJECTION, SOLUTION EPIDURAL; INFILTRATION; INTRACAUDAL; PERINEURAL at 09:03

## 2020-11-05 LAB
C TRACH DNA SPEC QL NAA+PROBE: NEGATIVE
N GONORRHOEA DNA SPEC QL NAA+PROBE: NEGATIVE

## 2021-03-10 ENCOUNTER — OFFICE VISIT (OUTPATIENT)
Dept: URGENT CARE | Age: 26
End: 2021-03-10
Payer: COMMERCIAL

## 2021-03-10 VITALS
SYSTOLIC BLOOD PRESSURE: 98 MMHG | BODY MASS INDEX: 30.45 KG/M2 | HEIGHT: 67 IN | TEMPERATURE: 98.6 F | DIASTOLIC BLOOD PRESSURE: 65 MMHG | WEIGHT: 194 LBS | RESPIRATION RATE: 17 BRPM | HEART RATE: 90 BPM | OXYGEN SATURATION: 99 %

## 2021-03-10 DIAGNOSIS — N89.8 VAGINAL ITCHING: Primary | ICD-10-CM

## 2021-03-10 LAB
SL AMB  POCT GLUCOSE, UA: NEGATIVE
SL AMB LEUKOCYTE ESTERASE,UA: NEGATIVE
SL AMB POCT BILIRUBIN,UA: NEGATIVE
SL AMB POCT BLOOD,UA: NEGATIVE
SL AMB POCT CLARITY,UA: CLEAR
SL AMB POCT COLOR,UA: YELLOW
SL AMB POCT KETONES,UA: 5
SL AMB POCT NITRITE,UA: NEGATIVE
SL AMB POCT PH,UA: 8.5
SL AMB POCT SPECIFIC GRAVITY,UA: 1
SL AMB POCT URINE PROTEIN: NORMAL
SL AMB POCT UROBILINOGEN: 0.2

## 2021-03-10 PROCEDURE — 99213 OFFICE O/P EST LOW 20 MIN: CPT | Performed by: NURSE PRACTITIONER

## 2021-03-10 PROCEDURE — 81002 URINALYSIS NONAUTO W/O SCOPE: CPT | Performed by: NURSE PRACTITIONER

## 2021-03-10 PROCEDURE — 99283 EMERGENCY DEPT VISIT LOW MDM: CPT | Performed by: NURSE PRACTITIONER

## 2021-03-10 PROCEDURE — G0382 LEV 3 HOSP TYPE B ED VISIT: HCPCS | Performed by: NURSE PRACTITIONER

## 2021-03-10 NOTE — PATIENT INSTRUCTIONS
Follow up with ob/gyn   If symptoms worsen go to the ER for further evaluation  don't put any other OTC medications in the vagina at this time, symptoms are improving since using monostat    Aware unable to do pelvic exams here in the office  Urine dip was negative for infection

## 2021-03-10 NOTE — PROGRESS NOTES
NAME: Svitlana Nurse is a 22 y o  female  : 1995    MRN: [id number]    BP 98/65   Pulse 90   Temp 98 6 °F (37 °C)   Resp 17   Ht 5' 7" (1 702 m)   Wt 88 kg (194 lb)   LMP 03/10/2021 (Exact Date)   SpO2 99%   BMI 30 38 kg/m²     Assessment and Plan   Vaginal itching [N89 8]  1  Vaginal itching  POCT urine dip    CANCELED: POCT urine dip       Jade was seen today for possible uti  Diagnoses and all orders for this visit:    Vaginal itching  -     Cancel: POCT urine dip  -     POCT urine dip        Patient Instructions   Patient Instructions   Follow up with ob/gyn   If symptoms worsen go to the ER for further evaluation  don't put any other OTC medications in the vagina at this time, symptoms are improving since using monostat    Aware unable to do pelvic exams here in the office  Urine dip was negative for infection             Proceed to the nearest ER if symptoms worsen, Follow up with your PCP  Continue to social distance, wash your hands, and wear your masks  Please continue to follow the CDC  gov guidelines daily for they are subject to change on COVID-19    Chief Complaint     Chief Complaint   Patient presents with    Possible UTI     c/o vaginal itching and white vaginal discharge +3d, tried Monostat  History of Present Illness     21 yo female here today with vaginal itching and discharge for the past three days, she has used monostat for the treatment for the possible yeast infection 3 days ago and her symptoms started to improve  Patient denies having any burning with urination and denies having any further itching  She has minimal amount of discharge over patient just started her period and not sure of her symptoms are from her  Or the Monistat  Discussed with patient that I am unable to perform pelvic exam is here at this office but due to her symptoms improving to follow up with her OBGY N   Symptoms return to go to the nearest ER for further evaluation    Patient denies having abdominal discomfort back pain or fevers at this time  Patient verbalizes understanding  Review of Systems   Review of Systems   Gastrointestinal: Negative  Endocrine: Negative  Genitourinary: Positive for vaginal bleeding (currently on her period) and vaginal discharge  Negative for difficulty urinating, flank pain, frequency, hematuria, menstrual problem, pelvic pain, urgency and vaginal pain  Musculoskeletal: Negative  Skin: Negative            Current Medications       Current Outpatient Medications:     acetaminophen (TYLENOL) 325 mg tablet, Take 2 tablets (650 mg total) by mouth every 6 (six) hours as needed for headaches, Disp: 30 tablet, Rfl: 0    benzocaine-menthol-lanolin-aloe (DERMOPLAST) 20-0 5 % topical spray, Apply 1 application topically 4 (four) times a day as needed for mild pain (Patient not taking: Reported on 1/16/2020), Disp: , Rfl: 0    ferrous sulfate 324 (65 Fe) mg, Take 1 tablet (324 mg total) by mouth daily before breakfast (Patient not taking: Reported on 1/16/2020), Disp: 30 tablet, Rfl: 3    ibuprofen (MOTRIN) 600 mg tablet, Take 1 tablet (600 mg total) by mouth every 6 (six) hours as needed for mild pain (Patient not taking: Reported on 1/16/2020), Disp: 30 tablet, Rfl: 0    ketorolac (ACULAR) 0 5 % ophthalmic solution, Administer 1 drop to the right eye every 6 (six) hours as needed (Eye Pain) (Patient not taking: Reported on 1/16/2020), Disp: 5 mL, Rfl: 0    ondansetron (ZOFRAN-ODT) 4 mg disintegrating tablet, Take 1 tablet (4 mg total) by mouth every 6 (six) hours as needed for nausea or vomiting (Patient not taking: Reported on 6/6/2020), Disp: 20 tablet, Rfl: 0    witch hazel-glycerin (TUCKS) topical pad, Apply 1 pad topically as needed for irritation (Patient not taking: Reported on 1/16/2020), Disp: , Rfl: 0    Current Facility-Administered Medications:     cefTRIAXone (ROCEPHIN) injection 250 mg, 250 mg, Intramuscular, Once, Pablito Divers, FAUSTINA    Current Allergies     Allergies as of 03/10/2021    (No Known Allergies)              Past Medical History:   Diagnosis Date    Urinary tract infection        Past Surgical History:   Procedure Laterality Date    INDUCED          Family History   Problem Relation Age of Onset    No Known Problems Mother     No Known Problems Father          Medications have been verified  The following portions of the patient's history were reviewed and updated as appropriate: allergies, current medications, past family history, past medical history, past social history, past surgical history and problem list     Objective   BP 98/65   Pulse 90   Temp 98 6 °F (37 °C)   Resp 17   Ht 5' 7" (1 702 m)   Wt 88 kg (194 lb)   LMP 03/10/2021 (Exact Date)   SpO2 99%   BMI 30 38 kg/m²      Physical Exam     Physical Exam  Constitutional:       Appearance: Normal appearance  Cardiovascular:      Rate and Rhythm: Normal rate and regular rhythm  Pulmonary:      Effort: Pulmonary effort is normal    Abdominal:      General: Abdomen is flat  Bowel sounds are normal       Tenderness: There is no abdominal tenderness  Musculoskeletal: Normal range of motion  Neurological:      General: No focal deficit present  Mental Status: She is alert and oriented to person, place, and time  Note: Portions of this record may have been created with voice recognition software  Occasional wrong word or "sound a like" substitutions may have occurred due to the inherent limitations of voice recognition software  Please read the chart carefully and recognize, using context, where substitutions have occurred  ONEAL Muse

## 2023-05-22 ENCOUNTER — OFFICE VISIT (OUTPATIENT)
Age: 28
End: 2023-05-22

## 2023-05-22 VITALS
SYSTOLIC BLOOD PRESSURE: 110 MMHG | WEIGHT: 201 LBS | HEIGHT: 67 IN | DIASTOLIC BLOOD PRESSURE: 72 MMHG | BODY MASS INDEX: 31.55 KG/M2

## 2023-05-22 DIAGNOSIS — Z01.419 ENCOUNTER FOR ANNUAL ROUTINE GYNECOLOGICAL EXAMINATION: Primary | ICD-10-CM

## 2023-05-22 DIAGNOSIS — Z11.3 SCREENING FOR STD (SEXUALLY TRANSMITTED DISEASE): ICD-10-CM

## 2023-05-22 DIAGNOSIS — Z12.4 SCREENING FOR CERVICAL CANCER: ICD-10-CM

## 2023-05-22 DIAGNOSIS — Z97.5 IUD (INTRAUTERINE DEVICE) IN PLACE: ICD-10-CM

## 2023-05-22 NOTE — PATIENT INSTRUCTIONS
Intrauterine Device   AMBULATORY CARE:   An IUD  is a type of birth control that is inserted into your uterus  It is a small, flexible piece of plastic with a string on the end  It is inserted and removed by your healthcare provider  IUDs prevent sperm from reaching or fertilizing an egg  IUDs also prevent a fertilized egg from attaching to the uterus and developing into a fetus  Common types of IUDs:  Your healthcare provider will recommend the type of IUD that is right for you  This is based on your age and if you have had a child  If you have not had a child, a smaller IUD will be used  A copper IUD  slowly releases a small amount of copper into your uterus  This IUD can remain in place for up to 10 years  A hormone-releasing IUD  slowly releases a small amount of progesterone into your uterus  Progesterone is a hormone that is made by your body to help control your periods  This IUD can remain in place for 3 to 5 years  Seek care immediately if:   You have severe pain or bleeding during your period  You have a fever and severe abdominal pain  Call your doctor or gynecologist if:   You think you are pregnant  The IUD has come out  You have bleeding from your vagina after you have sex, and it is not your period  You have pain during sex  You cannot feel the IUD string, the string feels longer, or you feel the plastic of the IUD itself  You have vaginal discharge that is green, yellow, or has a foul odor  You have questions or concerns about your condition or care  Advantages of an IUD:   An IUD is 98% to 99% effective in preventing pregnancy  The IUD can be removed by your healthcare provider if you decide to have a baby  You may be able to get pregnant as soon as the IUD is removed  An IUD protects you from pregnancy right after it is inserted  You do not have to stop sexual activity to insert it   You do not have to remember to take your birth control pill     Copper IUDs are safer for some women than oral birth control pills  Examples include women who smoke or have a history of blood clots  Hormone-releasing IUDs may decrease certain health problems  Examples include bleeding and cramping that happen with your monthly period  Disadvantages of an IUD:   There is a small chance that you could get pregnant  Sometimes the IUD cannot be removed after you get pregnant  This increases your risk of a miscarriage or an ectopic pregnancy  Ectopic pregnancy is when the fertilized egg starts to grow somewhere other than your uterus  An IUD does not protect you from sexually transmitted infections  You may have cramps during the first weeks after you get the IUD  A copper IUD may cause your monthly period to be heavier or more painful  This is more common within the first 3 months after you get the IUD  You may need to have your IUD removed if your bleeding or pain becomes severe  You may have spotting between periods  There is a small risk of an infection within the first 20 days after the IUD is placed  Infection can lead to pelvic inflammatory disease  This can cause infertility  Your uterus may tear when the IUD is inserted  The IUD may slip part or all of the way out of your uterus  Self-care:   NSAIDs , such as ibuprofen, help decrease swelling, pain, and fever  This medicine is available with or without a doctor's order  NSAIDs can cause stomach bleeding or kidney problems in certain people  If you take blood thinner medicine, always ask if NSAIDs are safe for you  Always read the medicine label and follow directions  Apply heat to relieve pain and cramping  Use a heating pad set on low  Apply heat to your lower abdomen for 20 minutes every hour, or as directed  Return to activities as directed  Your healthcare provider will tell you when it is okay to return to work, school, or other activities      Do not use a tampon or have sex until your provider says it is okay  Make sure your IUD is in place: An IUD has a string that is made of plastic thread  One to 2 inches of this string hangs into your vagina  You cannot see this string, and it should not cause problems when you have sex  Check your IUD string every 3 days for the first 3 months that you have your IUD  After that, check the string after each monthly period  Do the following to check the placement of your IUD:  Wash your hands with soap and warm water  Dry them with a clean towel  Bend your knees and squat low to the ground  Gently put your index finger inside your vagina  The cervix is at the top of the vagina and feels like the tip of your nose  Feel for the IUD string  Do not pull on the string  You should not be able to feel the firm plastic of the IUD itself  Wash your hands after you check your IUD string  For more information:   [facility] [address] [place]  Phone: [phone] - [phone]  Web Address: https://[employer]/  org    Follow up with your doctor as directed:  Write down your questions so you remember to ask them during your visits  © Copyright Cassandra Crowley 2022 Information is for End User's use only and may not be sold, redistributed or otherwise used for commercial purposes  The above information is an  only  It is not intended as medical advice for individual conditions or treatments  Talk to your doctor, nurse or pharmacist before following any medical regimen to see if it is safe and effective for you

## 2023-05-22 NOTE — PROGRESS NOTES
Patient is here today for a gynecological annual exam    No questions or concerns today  LMP: 23   Menarche age: 15    BC: IUD - 2019    Last pap: 10/31/2018     Hx of abnormal paps?  No    Gardasil: Not interested

## 2023-05-22 NOTE — ASSESSMENT & PLAN NOTE
[facility]  Patient unsure of which one  Blue strings on exam consistent with [place] or Nate Chapa  Due to be switched next year  Patient considering Mirena IUD  Pamphlets provided

## 2023-05-22 NOTE — PROGRESS NOTES
Rosa Rodriguez      Assessment and Plan:  Yearly exam    Encounter for annual routine gynecological examination  Normal findings on routine gyn exam  ASCCP guidelines reviewed  Pap collected today  SBE encouraged  CBE annually  Mammograms >39yo  Daily exercise and healthy diet with adequate calcium and vitamin D encouraged  Weight bearing exercises a minimum of 150 minutes/week advised  Advised to call with any issues, all concerns & questions addressed  See provided information in your after visit summary    IUD (intrauterine device) in place  [facility]  Patient unsure of which one  Blue strings on exam consistent with [place] or Franciso Cue  Due to be switched next year  Patient considering Mirena IUD  Pamphlets provided  Diagnoses and all orders for this visit:    Encounter for annual routine gynecological examination    Screening for cervical cancer  -     Liquid-based pap, screening    Screening for STD (sexually transmitted disease)  -     Chlamydia/GC amplified DNA by PCR    IUD (intrauterine device) in place      F/U Annually and PRN    Health Maintenance:    Last PAP: 10/31/2018  negative  Next PAP Due: collected today    Gardasil Vaccine Series: She has not had the Gardasil series  Subjective    CC: Yearly Exam     Andressa Mendez is a 32 y o  female  here for an annual exam       Menarche: 12    Patient's last menstrual period was 2023 (exact date)  Sexual activity: She is sexually active without pain, bleeding or dryness  Contraception: IUD since - unsure which one  [facility]  STD testing:  She does want STD testing today  non smoker, occasional drinker    Her menstrual cycles are regular every 28-30 days with heavy flow which is normal for her     She denies breast concerns, abnormal vaginal discharge, vaginal itching, odor, irritation, bowel/bladder dysfunction, urinary symptoms, pelvic pain, or dyspareunia today  Family hx of breast cancer: No  Family hx of ovarian cancer: Yes - patient unsure if Mother had cervical or ovarian cancer diagnosed at age 28  Family hx of colon cancer: Yes - MGF    Past Medical History:   Diagnosis Date   • Urinary tract infection      Past Surgical History:   Procedure Laterality Date   • INDUCED          There is no immunization history on file for this patient      Family History   Problem Relation Age of Onset   • No Known Problems Mother    • No Known Problems Father      Social History     Tobacco Use   • Smoking status: Never   • Smokeless tobacco: Never   Vaping Use   • Vaping Use: Never used   Substance Use Topics   • Alcohol use: No   • Drug use: No       Current Outpatient Medications:   •  acetaminophen (TYLENOL) 325 mg tablet, Take 2 tablets (650 mg total) by mouth every 6 (six) hours as needed for headaches, Disp: 30 tablet, Rfl: 0  •  benzocaine-menthol-lanolin-aloe (DERMOPLAST) 20-0 5 % topical spray, Apply 1 application topically 4 (four) times a day as needed for mild pain (Patient not taking: Reported on 2020), Disp: , Rfl: 0  •  ferrous sulfate 324 (65 Fe) mg, Take 1 tablet (324 mg total) by mouth daily before breakfast (Patient not taking: Reported on 2020), Disp: 30 tablet, Rfl: 3  •  ibuprofen (MOTRIN) 600 mg tablet, Take 1 tablet (600 mg total) by mouth every 6 (six) hours as needed for mild pain (Patient not taking: Reported on 2020), Disp: 30 tablet, Rfl: 0  •  ketorolac (ACULAR) 0 5 % ophthalmic solution, Administer 1 drop to the right eye every 6 (six) hours as needed (Eye Pain) (Patient not taking: Reported on 2020), Disp: 5 mL, Rfl: 0  •  ondansetron (ZOFRAN-ODT) 4 mg disintegrating tablet, Take 1 tablet (4 mg total) by mouth every 6 (six) hours as needed for nausea or vomiting (Patient not taking: Reported on 2020), Disp: 20 tablet, Rfl: 0  •  witch hazel-glycerin (TUCKS) topical pad, "Apply 1 pad topically as needed for irritation (Patient not taking: Reported on 2020), Disp: , Rfl: 0    Current Facility-Administered Medications:   •  cefTRIAXone (ROCEPHIN) injection 250 mg, 250 mg, Intramuscular, Once, Star Toro PA-C  Patient Active Problem List    Diagnosis Date Noted   •  (spontaneous vaginal delivery) 2019       No Known Allergies    OB History    Para Term  AB Living   4 3 3 0 1 3   SAB IAB Ectopic Multiple Live Births   0 1 0 0 3      # Outcome Date GA Lbr Lux/2nd Weight Sex Delivery Anes PTL Lv   4 Term 19 40w4d  3515 g (7 lb 12 oz) F Vag-Spont None N JIGNESH   3 Term 14 40w0d  3827 g (8 lb 7 oz) F Vag-Spont EPI N JIGNESH   2 Term 11 42w0d  4196 g (9 lb 4 oz) M Vag-Spont EPI  JIGNESH   1 IAB                Vitals:    23 0706   Weight: 91 2 kg (201 lb)   Height: 5' 7\" (1 702 m)     Body mass index is 31 48 kg/m²  Review of Systems   Constitutional: Negative for chills, fatigue, fever and unexpected weight change  Gastrointestinal: Negative for abdominal pain, constipation, diarrhea, nausea and vomiting  Genitourinary: Negative for difficulty urinating, dyspareunia, dysuria, frequency, genital sores, hematuria, menstrual problem, pelvic pain, urgency, vaginal bleeding, vaginal discharge and vaginal pain  Musculoskeletal: Negative for back pain and myalgias  Skin: Negative for pallor and rash  Neurological: Negative for dizziness, light-headedness and headaches  Hematological: Negative for adenopathy  Psychiatric/Behavioral: Negative for dysphoric mood  Physical Exam  Constitutional:       General: She is not in acute distress  Appearance: Normal appearance  She is not ill-appearing  Genitourinary:      Urethral meatus normal       No lesions in the vagina  Right Labia: No rash, tenderness, lesions, skin changes or Bartholin's cyst      Left Labia: No tenderness, lesions, skin changes, Bartholin's cyst or rash     " No inguinal adenopathy present in the right or left side  No vaginal discharge, erythema, tenderness, bleeding or ulceration  Right Adnexa: not tender, not full and no mass present  Left Adnexa: not tender, not full and no mass present  Cervix is parous  No cervical motion tenderness, discharge, friability, lesion, polyp or nabothian cyst       IUD strings visualized  Uterus is not enlarged, fixed or tender  No uterine mass detected  Uterus is midaxial       No urethral prolapse, tenderness or discharge present  Bladder is not tender and urgency on palpation not present  Pelvic exam was performed with patient in the lithotomy position  Breasts:     Right: No swelling, inverted nipple, mass, nipple discharge, skin change or tenderness  Left: No swelling, inverted nipple, mass, nipple discharge, skin change or tenderness  HENT:      Head: Normocephalic and atraumatic  Eyes:      Conjunctiva/sclera: Conjunctivae normal    Pulmonary:      Effort: Pulmonary effort is normal    Abdominal:      General: There is no distension  Palpations: Abdomen is soft  Tenderness: There is no abdominal tenderness  Musculoskeletal:         General: Normal range of motion  Cervical back: Neck supple  Lymphadenopathy:      Upper Body:      Right upper body: No supraclavicular or axillary adenopathy  Left upper body: No supraclavicular or axillary adenopathy  Lower Body: No right inguinal adenopathy  No left inguinal adenopathy  Neurological:      Mental Status: She is alert and oriented to person, place, and time  Skin:     General: Skin is warm and dry  Psychiatric:         Mood and Affect: Mood normal          Behavior: Behavior normal          Thought Content: Thought content normal          Judgment: Judgment normal    Vitals and nursing note reviewed

## 2023-05-22 NOTE — ASSESSMENT & PLAN NOTE
Normal findings on routine gyn exam  ASCCP guidelines reviewed  Pap collected today  SBE encouraged  CBE annually  Mammograms >39yo  Gardasil series recommended if not already completed  Daily exercise and healthy diet with adequate calcium and vitamin D encouraged  Weight bearing exercises a minimum of 150 minutes/week advised  Advised to call with any issues, all concerns & questions addressed     See provided information in your after visit summary

## 2023-05-24 LAB
C TRACH DNA SPEC QL NAA+PROBE: NEGATIVE
N GONORRHOEA DNA SPEC QL NAA+PROBE: NEGATIVE

## 2023-05-25 LAB
LAB AP GYN PRIMARY INTERPRETATION: NORMAL
Lab: NORMAL

## 2024-02-21 PROBLEM — Z01.419 ENCOUNTER FOR ANNUAL ROUTINE GYNECOLOGICAL EXAMINATION: Status: RESOLVED | Noted: 2023-05-22 | Resolved: 2024-02-21

## 2024-04-10 ENCOUNTER — OFFICE VISIT (OUTPATIENT)
Age: 29
End: 2024-04-10
Payer: COMMERCIAL

## 2024-04-10 VITALS
BODY MASS INDEX: 33.75 KG/M2 | DIASTOLIC BLOOD PRESSURE: 70 MMHG | SYSTOLIC BLOOD PRESSURE: 100 MMHG | WEIGHT: 210 LBS | HEIGHT: 66 IN

## 2024-04-10 DIAGNOSIS — Z11.3 SCREEN FOR STD (SEXUALLY TRANSMITTED DISEASE): Primary | ICD-10-CM

## 2024-04-10 DIAGNOSIS — N89.8 VAGINAL DISCHARGE: ICD-10-CM

## 2024-04-10 PROBLEM — Z86.16 HISTORY OF COVID-19: Status: ACTIVE | Noted: 2023-07-14

## 2024-04-10 PROBLEM — I73.00 RAYNAUD'S PHENOMENON: Status: ACTIVE | Noted: 2023-07-14

## 2024-04-10 PROBLEM — I42.9 CARDIOMYOPATHY (HCC): Status: ACTIVE | Noted: 2023-07-14

## 2024-04-10 PROBLEM — R79.89 POSITIVE D DIMER: Status: ACTIVE | Noted: 2023-07-14

## 2024-04-10 PROCEDURE — 87491 CHLMYD TRACH DNA AMP PROBE: CPT

## 2024-04-10 PROCEDURE — 99213 OFFICE O/P EST LOW 20 MIN: CPT

## 2024-04-10 PROCEDURE — 87510 GARDNER VAG DNA DIR PROBE: CPT

## 2024-04-10 PROCEDURE — 87660 TRICHOMONAS VAGIN DIR PROBE: CPT

## 2024-04-10 PROCEDURE — 87480 CANDIDA DNA DIR PROBE: CPT

## 2024-04-10 PROCEDURE — 87591 N.GONORRHOEAE DNA AMP PROB: CPT

## 2024-04-10 RX ORDER — LEVONORGESTREL 19.5 MG/1
1 INTRAUTERINE DEVICE INTRAUTERINE
COMMUNITY

## 2024-04-10 NOTE — PROGRESS NOTES
Assessment/Plan:    No problem-specific Assessment & Plan notes found for this encounter.    -IUD due to be exchanged. Plan for procedure visit. Reviewed pre-IUD procedure with patient. She is interested in switching to Mirena IUD but is happy with Kyleena. Handouts provided on both.   -STD screening and vaginal swab collected. Perineal hygiene reviewed.      Diagnoses and all orders for this visit:    Screen for STD (sexually transmitted disease)  -     Chlamydia/GC amplified DNA by PCR    Vaginal discharge  -     VAGINOSIS DNA PROBE    Other orders  -     levonorgestrel (Kyleena) 19.5 MG intrauterine device; 1 each by Intrauterine Device route Once every 5 years          Subjective:      Patient ID: Jade Rodriguez is a 28 y.o. female here for irregular spotting/BTB and vaginal concerns. [facility]. She reports recently started having spotting between her menstrual cycles which is not common for her. She also reports increased vaginal discharge that is malodorous and cloudy. Discharge started last week. Sexually active, no new partners but would like to have STD screening today. No additional concerns.   Patient's last menstrual period was 2024 (exact date).      The following portions of the patient's history were reviewed and updated as appropriate: She  has a past medical history of Urinary tract infection.  She   Patient Active Problem List    Diagnosis Date Noted    Raynaud's phenomenon 2023    Positive D dimer 2023    History of COVID-19 2023    Cardiomyopathy (HCC) 2023    IUD (intrauterine device) in place 2023     (spontaneous vaginal delivery) 2019     She  has a past surgical history that includes Induced .  Her family history includes No Known Problems in her father and mother.  She  reports that she has never smoked. She has never used smokeless tobacco. She reports  "current alcohol use. She reports that she does not use drugs.  Current Outpatient Medications   Medication Sig Dispense Refill    levonorgestrel (Kyleena) 19.5 MG intrauterine device 1 each by Intrauterine Device route Once every 5 years      ibuprofen (MOTRIN) 600 mg tablet Take 1 tablet (600 mg total) by mouth every 6 (six) hours as needed for mild pain (Patient not taking: Reported on 4/10/2024) 30 tablet 0     Current Facility-Administered Medications   Medication Dose Route Frequency Provider Last Rate Last Admin    cefTRIAXone (ROCEPHIN) injection 250 mg  250 mg Intramuscular Once Renetta Camilo PA-C         She has No Known Allergies..    Review of Systems   Constitutional:  Negative for chills and fever.   Gastrointestinal:  Negative for abdominal pain.   Genitourinary:  Positive for menstrual problem and vaginal discharge. Negative for dyspareunia, dysuria, frequency, genital sores, hematuria, pelvic pain, urgency, vaginal bleeding and vaginal pain.   Skin:  Negative for rash and wound.   Hematological:  Negative for adenopathy.   Psychiatric/Behavioral:  Negative for dysphoric mood.          Objective:      /70 (BP Location: Left arm, Patient Position: Sitting)   Ht 5' 6\" (1.676 m)   Wt 95.3 kg (210 lb)   LMP 04/02/2024 (Exact Date)   BMI 33.89 kg/m²     Physical Exam  Constitutional:       General: She is not in acute distress.     Appearance: Normal appearance. She is not ill-appearing.   Genitourinary:      No lesions in the vagina.      Right Labia: No rash, tenderness, lesions or skin changes.     Left Labia: No tenderness, lesions, skin changes or rash.     No inguinal adenopathy present in the right or left side.     No vaginal discharge, erythema, tenderness, bleeding or ulceration.      Cervix is parous.      No cervical motion tenderness, discharge, friability, lesion, polyp or nabothian cyst.      IUD strings visualized.      Pelvic exam was performed with patient in the lithotomy " position.   HENT:      Head: Normocephalic and atraumatic.   Eyes:      Conjunctiva/sclera: Conjunctivae normal.   Pulmonary:      Effort: Pulmonary effort is normal.   Abdominal:      General: There is no distension.      Palpations: Abdomen is soft.      Tenderness: There is no abdominal tenderness.   Musculoskeletal:         General: Normal range of motion.      Cervical back: Neck supple.   Lymphadenopathy:      Lower Body: No right inguinal adenopathy. No left inguinal adenopathy.   Neurological:      Mental Status: She is alert and oriented to person, place, and time.   Skin:     General: Skin is warm and dry.   Psychiatric:         Mood and Affect: Mood normal.         Behavior: Behavior normal.   Vitals and nursing note reviewed.

## 2024-04-10 NOTE — PATIENT INSTRUCTIONS
Intrauterine Device   AMBULATORY CARE:   An IUD  is a type of birth control that is inserted into your uterus. It is a small, flexible piece of plastic with a string on the end. It is inserted and removed by your healthcare provider. IUDs prevent sperm from reaching or fertilizing an egg. IUDs also prevent a fertilized egg from attaching to the uterus and developing into a fetus.       Common types of IUDs:  Your healthcare provider will recommend the type of IUD that is right for you. This is based on your age and if you have had a child. If you have not had a child, a smaller IUD will be used.     A copper IUD  slowly releases a small amount of copper into your uterus. This IUD can remain in place for up to 10 years.    A hormone-releasing IUD  slowly releases a small amount of progesterone into your uterus. Progesterone is a hormone that is made by your body to help control your periods. This IUD can remain in place for 3 to 5 years.  Seek care immediately if:   You have severe pain or bleeding during your period.    You have a fever and severe abdominal pain.    Call your doctor or gynecologist if:   You think you are pregnant.    The IUD has come out.    You have bleeding from your vagina after you have sex, and it is not your period.    You have pain during sex.    You cannot feel the IUD string, the string feels longer, or you feel the plastic of the IUD itself.    You have vaginal discharge that is green, yellow, or has a foul odor.    You have questions or concerns about your condition or care.    Advantages of an IUD:   An IUD is 98% to 99% effective in preventing pregnancy.    The IUD can be removed by your healthcare provider if you decide to have a baby. You may be able to get pregnant as soon as the IUD is removed.    An IUD protects you from pregnancy right after it is inserted.    You do not have to stop sexual activity to insert it. You do not have to remember to take your birth control  pill.    Copper IUDs are safer for some women than oral birth control pills. Examples include women who smoke or have a history of blood clots.    Hormone-releasing IUDs may decrease certain health problems. Examples include bleeding and cramping that happen with your monthly period.    Disadvantages of an IUD:   There is a small chance that you could get pregnant. Sometimes the IUD cannot be removed after you get pregnant. This increases your risk of a miscarriage or an ectopic pregnancy. Ectopic pregnancy is when the fertilized egg starts to grow somewhere other than your uterus.    An IUD does not protect you from sexually transmitted infections.    You may have cramps during the first weeks after you get the IUD.    A copper IUD may cause your monthly period to be heavier or more painful. This is more common within the first 3 months after you get the IUD. You may need to have your IUD removed if your bleeding or pain becomes severe. You may have spotting between periods.    There is a small risk of an infection within the first 20 days after the IUD is placed. Infection can lead to pelvic inflammatory disease. This can cause infertility.    Your uterus may tear when the IUD is inserted. The IUD may slip part or all of the way out of your uterus.    Self-care:   NSAIDs , such as ibuprofen, help decrease swelling, pain, and fever. This medicine is available with or without a doctor's order. NSAIDs can cause stomach bleeding or kidney problems in certain people. If you take blood thinner medicine, always ask if NSAIDs are safe for you. Always read the medicine label and follow directions.    Apply heat to relieve pain and cramping.  Use a heating pad set on low. Apply heat to your lower abdomen for 20 minutes every hour, or as directed.    Return to activities as directed.  Your healthcare provider will tell you when it is okay to return to work, school, or other activities.    Do not use a tampon or have sex   until your provider says it is okay.    Make sure your IUD is in place:  An IUD has a string that is made of plastic thread. One to 2 inches of this string hangs into your vagina. You cannot see this string, and it should not cause problems when you have sex. Check your IUD string every 3 days for the first 3 months that you have your IUD. After that, check the string after each monthly period. Do the following to check the placement of your IUD:  Wash your hands with soap and warm water. Dry them with a clean towel.    Bend your knees and squat low to the ground.    Gently put your index finger inside your vagina. The cervix is at the top of the vagina and feels like the tip of your nose. Feel for the IUD string. Do not pull on the string. You should not be able to feel the firm plastic of the IUD itself.     Wash your hands after you check your IUD string.    For more information:   [facility] [place] [address]  Phone: [phone]  Web Address: [email or web address]    Follow up with your doctor as directed:  Write down your questions so you remember to ask them during your visits.  © Copyright [employer] 2023 Information is for End User's use only and may not be sold, redistributed or otherwise used for commercial purposes.  The above information is an  only. It is not intended as medical advice for individual conditions or treatments. Talk to your doctor, nurse or pharmacist before following any medical regimen to see if it is safe and effective for you.

## 2024-04-11 LAB
C TRACH DNA SPEC QL NAA+PROBE: NEGATIVE
CANDIDA RRNA VAG QL PROBE: NOT DETECTED
G VAGINALIS RRNA GENITAL QL PROBE: DETECTED
N GONORRHOEA DNA SPEC QL NAA+PROBE: NEGATIVE
T VAGINALIS RRNA GENITAL QL PROBE: NOT DETECTED

## 2024-04-12 DIAGNOSIS — B96.89 BV (BACTERIAL VAGINOSIS): Primary | ICD-10-CM

## 2024-04-12 DIAGNOSIS — N76.0 BV (BACTERIAL VAGINOSIS): Primary | ICD-10-CM

## 2024-04-12 RX ORDER — METRONIDAZOLE 500 MG/1
500 TABLET ORAL EVERY 12 HOURS SCHEDULED
Qty: 14 TABLET | Refills: 0 | Status: SHIPPED | OUTPATIENT
Start: 2024-04-12 | End: 2024-04-19

## 2024-08-05 ENCOUNTER — OFFICE VISIT (OUTPATIENT)
Dept: CARDIOLOGY CLINIC | Facility: CLINIC | Age: 29
End: 2024-08-05
Payer: COMMERCIAL

## 2024-08-05 VITALS
SYSTOLIC BLOOD PRESSURE: 110 MMHG | DIASTOLIC BLOOD PRESSURE: 78 MMHG | OXYGEN SATURATION: 96 % | WEIGHT: 210 LBS | HEART RATE: 76 BPM | RESPIRATION RATE: 16 BRPM | HEIGHT: 66 IN | BODY MASS INDEX: 33.75 KG/M2

## 2024-08-05 DIAGNOSIS — R93.1 ABNORMAL ECHOCARDIOGRAM: Primary | ICD-10-CM

## 2024-08-05 PROCEDURE — 99203 OFFICE O/P NEW LOW 30 MIN: CPT

## 2024-08-05 PROCEDURE — 93000 ELECTROCARDIOGRAM COMPLETE: CPT

## 2024-08-05 NOTE — ASSESSMENT & PLAN NOTE
Patient denies any cardiac symptoms  TTE showed mildly dilated LV; EF normal and no valvular disease noted  Patient to report any cardiac symptoms.

## 2024-08-05 NOTE — PROGRESS NOTES
OP Consultation - Cardiology    Jade Rodriguez 28 y.o. female MRN: [id number]    Physician Requesting Consult: Renita Ling MD    Reason for Consult / Chief Complaint: Cardiomyopathy    HPI:   Jade is a 28 year old female with hx of Raynaud's presenting for initial visit.    Patient went to [facility] in Oct, 2023 for sharp chest/back pain.  Patient had elevated D-dimer so a CTA PE study was done and lower extremity duplex which were both unremarkable.  Patient did have TTE which revealed mildly dilated left ventricle.  EF of 55 to 60%, no valvular disease.  Patient was recommended outpatient cardiology follow-up.    Patient denies any active symptoms or symptoms since discharge. Patient denies regular exercise. Endorses overall balanced.     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     Patient notes that she had palpitations several years ago. They have significantly improved and she only reports rare events.     ECG done today- sinus rhythm with sinus arrhythmia.    Labs reviewed- BMP, CBC and Lipid Panel WNL    Social History:  Tobacco: None  Alcohol:Socially, not regularly or to excess.     FAMILY HISTORY: No known cardiac disease  Family History   Problem Relation Age of Onset    No Known Problems Mother     No Known Problems Father     Breast cancer Neg Hx     Colon cancer Neg Hx     Ovarian cancer Neg Hx     Uterine cancer Neg Hx     Cervical cancer Neg Hx         MEDS & ALLERGIES:  No Known Allergies      Current Outpatient Medications:     ibuprofen (MOTRIN) 600 mg tablet, Take 1 tablet (600 mg total) by mouth every 6 (six) hours as needed for mild pain, Disp: 30 tablet, Rfl: 0    levonorgestrel (Kyleena) 19.5 MG intrauterine device, 1 each by Intrauterine Device route Once every 5 years, Disp: , Rfl:     Current Facility-Administered Medications:     cefTRIAXone (ROCEPHIN) injection 250 mg, 250 mg, Intramuscular, Once, Renetta Camilo PA-C    Past Medical History:   Diagnosis Date     "Urinary tract infection          Review of Systems:  Review of Systems   Constitutional:  Negative for chills, diaphoresis and fever.   Respiratory:  Negative for cough, chest tightness and shortness of breath.    Cardiovascular:  Negative for chest pain, palpitations and leg swelling.   Gastrointestinal:  Negative for abdominal distention, blood in stool, nausea and vomiting.   Genitourinary:  Negative for difficulty urinating.   Musculoskeletal:  Negative for arthralgias and back pain.   Neurological:  Negative for dizziness, syncope, light-headedness and headaches.   Psychiatric/Behavioral:  Negative for agitation and confusion. The patient is not nervous/anxious.        PHYSICAL EXAM:  Vitals:   Vitals:    08/05/24 0916   BP: 110/78   BP Location: Right arm   Patient Position: Sitting   Cuff Size: Standard   Pulse: 76   Resp: 16   SpO2: 96%   Weight: 95.3 kg (210 lb)   Height: 5' 6\" (1.676 m)        Physical Exam:  Physical Exam  Vitals reviewed.   Constitutional:       Appearance: Normal appearance. She is normal weight.   HENT:      Head: Normocephalic.      Mouth/Throat:      Mouth: Mucous membranes are moist.   Neck:      Vascular: No carotid bruit.   Cardiovascular:      Rate and Rhythm: Normal rate and regular rhythm.      Pulses: Normal pulses.      Heart sounds: Normal heart sounds, S1 normal and S2 normal. No murmur heard.     No friction rub. No gallop.   Pulmonary:      Effort: Pulmonary effort is normal.      Breath sounds: Normal breath sounds.   Abdominal:      Palpations: Abdomen is soft.   Musculoskeletal:      Cervical back: Neck supple.      Right lower leg: No edema.      Left lower leg: No edema.   Skin:     General: Skin is warm and dry.      Capillary Refill: Capillary refill takes less than 2 seconds.   Neurological:      General: No focal deficit present.      Mental Status: She is alert and oriented to person, place, and time.   Psychiatric:         Mood and Affect: Mood normal.         " "Behavior: Behavior normal.         LABORATORY RESULTS:    Lab Results   Component Value Date    WBC 10.67 (H) 03/27/2019    HGB 11.2 (L) 03/27/2019    HCT 37.3 03/27/2019    MCV 79 (L) 03/27/2019     03/27/2019     Lab Results   Component Value Date    GLUCOSE 94 10/04/2015    CALCIUM 8.6 07/15/2023     10/04/2015    K 4.0 07/15/2023    CO2 24 07/15/2023     07/15/2023    BUN 13 07/15/2023    CREATININE 0.72 07/15/2023     No results found for: \"HGBA1C\"    Lipid Profile:   No results found for: \"CHOL\"  No results found for: \"HDL\"  No results found for: \"LDLCALC\"  No results found for: \"TRIG\"    The ASCVD Risk score (Faustino SHELTON, et al., 2019) failed to calculate for the following reasons:    The 2019 ASCVD risk score is only valid for ages 40 to 79    Imaging: I have personally reviewed pertinent reports.    No results found.    EKG reviewed personally: sinus rhythm with sinus arrhythmia    Assessment and Plan:  1. Abnormal echocardiogram  Assessment & Plan:  Patient denies any cardiac symptoms  TTE showed mildly dilated LV; EF normal and no valvular disease noted  Patient to report any cardiac symptoms.  Orders:  -     POCT ECG      Jade was seen today for new patient visit.    Diagnoses and all orders for this visit:    Abnormal echocardiogram  -     POCT ECG          Return to clinic [facility]  8/5/2024,12:55 PM    "

## 2024-08-27 ENCOUNTER — PROCEDURE VISIT (OUTPATIENT)
Age: 29
End: 2024-08-27
Payer: COMMERCIAL

## 2024-08-27 VITALS
DIASTOLIC BLOOD PRESSURE: 84 MMHG | HEIGHT: 66 IN | SYSTOLIC BLOOD PRESSURE: 120 MMHG | BODY MASS INDEX: 33.59 KG/M2 | WEIGHT: 209 LBS

## 2024-08-27 DIAGNOSIS — Z30.433 ENCOUNTER FOR REMOVAL AND REINSERTION OF IUD: Primary | ICD-10-CM

## 2024-08-27 PROBLEM — Z97.5 IUD (INTRAUTERINE DEVICE) IN PLACE: Status: RESOLVED | Noted: 2023-05-22 | Resolved: 2024-08-27

## 2024-08-27 PROBLEM — Z86.16 HISTORY OF COVID-19: Status: RESOLVED | Noted: 2023-07-14 | Resolved: 2024-08-27

## 2024-08-27 PROCEDURE — 58301 REMOVE INTRAUTERINE DEVICE: CPT | Performed by: NURSE PRACTITIONER

## 2024-08-27 PROCEDURE — 58300 INSERT INTRAUTERINE DEVICE: CPT | Performed by: NURSE PRACTITIONER

## 2024-08-27 NOTE — PROGRESS NOTES
IUD Procedure    Date/Time: 2024 9:20 AM    Performed by: [facility] [place]  Authorized by: [facility] [place]    Other Assisting Provider: No    Verbal consent obtained?: No    Written consent obtained?: Yes    Risks and benefits: Risks, benefits and alternatives were discussed    Consent given by:  Patient  Time Out:     Time out performed at:  2024 9:00 AM  Patient states understanding of procedure being performed: Yes    Patient's understanding of procedure matches consent: Yes    Procedure consent matches procedure scheduled: Yes    Relevant documents present and verified: Yes    Patient identity confirmed:  Verbally with patient  Select procedure: IUD removal    IUD Removal:     Reason for removal comment:   IUD    Removed without complications: yes      Tenaculum/Allis/Ring Forceps applied to cervix: yes      Strings visualized: yes      IUD intact: yes      Performed with ultrasound guidance: no    Removal Comments:      New Kyleena placed next

## 2024-08-27 NOTE — PATIENT INSTRUCTIONS
Patient Education     Levonorgestrel (IUD) (ANJANA moeller)   Brand Names: US Kyleena; Liletta (52 MG); Mirena (52 MG); Rebeka   Brand Names: Solange Kyleena; Mirena   What is this drug used for?   It is used to prevent pregnancy.  It is used to treat heavy bleeding during monthly periods (menstruation).  What do I need to tell my doctor BEFORE I take this drug?   If you are allergic to this drug; any part of this drug; or any other drugs, foods, or substances. Tell your doctor about the allergy and what signs you had.  If you are pregnant or may be pregnant. Do not use this drug if you are pregnant.  If you have an IUD (intrauterine device) in place.  If you have any of these health problems: Active liver disease, chlamydia or gonorrhea, endometritis (including after a birth), genital tract infection, liver tumor, pelvic infection, uterine or cervical tumor or growth, uterine problems like uterine fibroids, or untreated cervicitis or vaginitis.  If you have had a pelvic infection within the past 3 months that happened before, during, or after an  or miscarriage.  If you have unexplained vaginal bleeding.  If you have ever had any of these health problems: Breast cancer, cancer where hormones make it grow, or pelvic inflammatory disease.  If you have a weak immune system, you use or abuse IV drugs, or you have a disease that may cause a weak immune system like HIV.  If you or your partner have sex with more than one person.  This is not a list of all drugs or health problems that interact with this drug.  Tell your doctor and pharmacist about all of your drugs (prescription or OTC, natural products, vitamins) and health problems. You must check to make sure that it is safe for you to take this drug with all of your drugs and health problems. Do not start, stop, or change the dose of any drug without checking with your doctor.  What are some things I need to know or do while I take this drug?   Tell all  of your health care providers that you take this drug. This includes your doctors, nurses, pharmacists, and dentists.  Ovarian cysts may rarely happen. Most of the time, these go back to normal in 2 to 3 months. Sometimes, ovarian cysts can cause pain and the need for surgery. Talk with the doctor.  If you get pregnant while taking this drug, the chance of pregnancy outside of the uterus (ectopic pregnancy) may be raised. Talk with your doctor.  This drug does not stop the spread of diseases like HIV or hepatitis that are passed through having sex. Do not have any kind of sex without using a latex or polyurethane condom. If you have questions, talk with your doctor.  If you are having an MRI, talk with your doctor.  Life-threatening infection can happen within a few days after this drug was put in. Call your doctor right away if you have fever or pain where this drug was placed.  Very bad health problems and the need for surgery can happen if this drug goes through the uterus. Most of the time, this happens when this drug is put in but can happen at any time during use. The chance is raised if this drug is put in while you are breast-feeding. This drug may also not prevent pregnancy if this happens. If you have questions, talk with your doctor.  This drug may raise the chance of a health problem called pelvic inflammatory disease (PID). The chance may be higher if you or your partner have sex with other partners. PID can lead to other health problems like not being able to get pregnant, surgery, or rarely death. Talk with your doctor.  If you cannot feel the string or you think the IUD has come out, call your doctor. The risk of the IUD coming out may be higher if you have heavy menstrual bleeding or a higher than normal body mass index (BMI). You may get pregnant if this drug comes out. Use another kind of birth control like a condom until you see your doctor. If you think the IUD has come out and you had sex  within the last week, call your doctor. You may be at risk to get pregnant.  Menstrual periods may stop in some people after 1 year of using this drug. Periods will go back to normal when this drug is taken out. If you do not have a period for 6 weeks when this drug is in place, call your doctor.  You may need to have a pregnancy test before this drug is put in. If you have questions, talk with your doctor.  If you think you may be pregnant while this drug is in place, call your doctor right away. Severe and sometimes deadly health problems can happen when this drug is removed or if it needs to be left in place during pregnancy. This includes loss of fertility, infections, and loss of the unborn baby.  Tell your doctor if you are breast-feeding. You will need to talk about any risks to your baby.  What are some side effects that I need to call my doctor about right away?   WARNING/CAUTION: Even though it may be rare, some people may have very bad and sometimes deadly side effects when taking a drug. Tell your doctor or get medical help right away if you have any of the following signs or symptoms that may be related to a very bad side effect:  Signs of an allergic reaction, like rash; hives; itching; red, swollen, blistered, or peeling skin with or without fever; wheezing; tightness in the chest or throat; trouble breathing, swallowing, or talking; unusual hoarseness; or swelling of the mouth, face, lips, tongue, or throat.  Signs of high blood pressure like very bad headache or dizziness, passing out, or change in eyesight.  Weakness on 1 side of the body, trouble speaking or thinking, change in balance, drooping on one side of the face, or blurred eyesight.  Chest pain or pressure.  Stomach pain.  Pelvic pain.  Vaginal bleeding that is not normal.  Vaginal itching or discharge.  Fever or chills.  A lump in the breast or breast soreness.  Painful sex.  Yellow skin or eyes.  Depression or other mood  changes.  Genital sores.  Some pain, bleeding, dizziness, or other reactions may happen when the IUD is put in or taken out. Talk with your doctor right away if these are severe, do not go away within 30 minutes after the IUD is put in, or happen after it is taken out. Talk with your doctor right away if you have other reactions like seizures, slow heartbeat, or passing out.  What are some other side effects of this drug?   All drugs may cause side effects. However, many people have no side effects or only have minor side effects. Call your doctor or get medical help if any of these side effects or any other side effects bother you or do not go away:  Acne or greasy skin.  Headache.  Upset stomach or throwing up.  Painful periods.  Back pain.  Weight gain.  Vaginal bleeding and spotting between menstrual periods may happen, especially during the first 3 to 6 months. Cramping may also happen during the first few weeks. Call your doctor if the bleeding stays heavier than normal or if it gets heavier after it has been light for a while.  These are not all of the side effects that may occur. If you have questions about side effects, call your doctor. Call your doctor for medical advice about side effects.  You may report side effects to your national health agency.  You may report side effects to the FDA at 1-114.701.7914. You may also report side effects at https://www.fda.gov/medwatch.  How is this drug best taken?   Use this drug as ordered by your doctor. Read all information given to you. Follow all instructions closely.  This drug will be given to you by a doctor.  Follow up with the doctor as you have been told.  Check to see if this drug is in place as you have been told by your doctor or read the package insert.  Based on when this drug is put in, you may need to use a non-hormone type of birth control like condoms to prevent pregnancy for some time. Follow what your doctor has told you to do about when to have  this drug put in and using a non-hormone type of birth control.  If this drug is being removed and you do not want to get pregnant, talk with your doctor. You will need to use another kind of birth control like a condom the week before it is removed.  This drug is not for use as emergency birth control. Talk with the doctor.  Tampons or menstrual cups may be used with this drug. Change these with care to avoid pulling the threads of this drug. If you think you may have pulled this drug out of place, use a non-hormone type of birth control like condoms or spermicide or do not have sex, and call your doctor.  What do I do if I miss a dose?   Call your doctor to find out what to do.  How do I store and/or throw out this drug?   If you need to store this drug at home, talk with your doctor, nurse, or pharmacist about how to store it.  General drug facts   If your symptoms or health problems do not get better or if they become worse, call your doctor.  Do not share your drugs with others and do not take anyone else's drugs.  Keep all drugs in a safe place. Keep all drugs out of the reach of children and pets.  Throw away unused or  drugs. Do not flush down a toilet or pour down a drain unless you are told to do so. Check with your pharmacist if you have questions about the best way to throw out drugs. There may be drug take-back programs in your area.  Some drugs may have another patient information leaflet. If you have any questions about this drug, please talk with your doctor, nurse, pharmacist, or other health care provider.  Some drugs may have another patient information leaflet. Check with your pharmacist. If you have any questions about this drug, please talk with your doctor, nurse, pharmacist, or other health care provider.  If you think there has been an overdose, call your poison control center or get medical care right away. Be ready to tell or show what was taken, how much, and when it  happened.  Consumer Information Use and Disclaimer   This generalized information is a limited summary of diagnosis, treatment, and/or medication information. It is not meant to be comprehensive and should be used as a tool to help the user understand and/or assess potential diagnostic and treatment options. It does NOT include all information about conditions, treatments, medications, side effects, or risks that may apply to a specific patient. It is not intended to be medical advice or a substitute for the medical advice, diagnosis, or treatment of a health care provider based on the health care provider's examination and assessment of a patient's specific and unique circumstances. Patients must speak with a health care provider for complete information about their health, medical questions, and treatment options, including any risks or benefits regarding use of medications. This information does not endorse any treatments or medications as safe, effective, or approved for treating a specific patient. Up[employer] and its affiliates disclaim any warranty or liability relating to this information or the use thereof. The use of this information is governed by the Terms of Use, available at https://www.wolterskluwer.com/en/know/clinical-effectiveness-terms.  Last Reviewed Date   2023-07-26  Copyright   © 2024 [employer], [employer] and its affiliates and/or licensors. All rights reserved.

## 2024-08-27 NOTE — PROGRESS NOTES
IUD Procedure    Date/Time: 8/27/2024 9:22 AM    Performed by: [facility] [place]  Authorized by: [facility] [place]    Other Assisting Provider: No    Verbal consent obtained?: No    Written consent obtained?: Yes    Risks and benefits: Risks, benefits and alternatives were discussed    Consent given by:  Patient  Time Out:     Time out performed at:  8/27/2024 9:10 AM  Patient states understanding of procedure being performed: Yes    Patient's understanding of procedure matches consent: Yes    Procedure consent matches procedure scheduled: Yes    Relevant documents present and verified: Yes    Patient identity confirmed:  Verbally with patient  Select procedure: IUD insertion    IUD Insertion:     Pelvic exam performed: yes      Negative GC/chlamydia test: yes (4/2024 neg, monogamous x 5 years, repeat declined)      Negative urine pregnancy test: yes      Cervix cleaned and prepped: yes      Speculum placed in vagina: yes      Tenaculum applied to cervix: yes      Allis applied to cervix: no      IUD inserted with no complications: yes      Strings trimmed: yes      Uterus sounded: yes      Uterus sound depth (cm):  7.5    IUD type:  1 each levonorgestrel 19.5 MG  Post-procedure:     Patient tolerated procedure well: yes      Patient will follow up after next period: 3 months.    Insertion Comments:      Return in 3 months for menses recheck. All questions answered.  Discussed risks of IUD placement today which include perforation, embedment, infection, higher risk for ectopic pregnancy, bleeding etc. Patient will monitor for these and call with any concerns.

## 2025-05-12 ENCOUNTER — OFFICE VISIT (OUTPATIENT)
Age: 30
End: 2025-05-12
Payer: COMMERCIAL

## 2025-05-12 VITALS
HEIGHT: 66 IN | DIASTOLIC BLOOD PRESSURE: 70 MMHG | OXYGEN SATURATION: 98 % | BODY MASS INDEX: 32.92 KG/M2 | SYSTOLIC BLOOD PRESSURE: 110 MMHG | HEART RATE: 87 BPM | TEMPERATURE: 95.5 F | WEIGHT: 204.8 LBS

## 2025-05-12 DIAGNOSIS — I42.9 CARDIOMYOPATHY, UNSPECIFIED TYPE (HCC): ICD-10-CM

## 2025-05-12 DIAGNOSIS — Z00.00 ANNUAL PHYSICAL EXAM: Primary | ICD-10-CM

## 2025-05-12 DIAGNOSIS — Z11.59 NEED FOR HEPATITIS C SCREENING TEST: ICD-10-CM

## 2025-05-12 DIAGNOSIS — B35.1 ONYCHOMYCOSIS: ICD-10-CM

## 2025-05-12 DIAGNOSIS — Z76.89 ENCOUNTER TO ESTABLISH CARE: ICD-10-CM

## 2025-05-12 PROCEDURE — 99385 PREV VISIT NEW AGE 18-39: CPT

## 2025-05-12 RX ORDER — CICLOPIROX OLAMINE 7.7 MG/100ML
1 SUSPENSION TOPICAL 2 TIMES DAILY
Qty: 30 ML | Refills: 1 | Status: SHIPPED | OUTPATIENT
Start: 2025-05-12

## 2025-05-12 NOTE — PROGRESS NOTES
Adult Annual Physical  Name: Jade Rodriguez      : 1995      MRN: [id number]  Encounter Provider: Susu Medel PA-C  Encounter Date: 2025   Encounter department: [facility] [place]    :  Assessment & Plan  Annual physical exam  - Discussed healthy diet, lifestyle, and screening recommendations with the patient. Appropriate orders placed.   - recent lab results reviewed and/or lab orders placed as appropriate for monitoring of the patient's chronic conditions or evaluation of acute complaints  - Patient is young and otherwise healthy.  Lab work from  reviewed.  Lipid panel, CBC, CMP largely within normal limits.  We will hold off on repeating lab work at this time with the exception of hep C screening.  We can consider repeating basic labs next year for screening.  - D-dimer was mildly elevated in  when patient went to the hospital, but CTA and bilateral lower extremity duplex were both negative for VTE.         Encounter to establish care         Need for hepatitis C screening test  Discussed recommended screening with the patient and appropriate order placed.    Orders:  •  Hepatitis C Antibody; Future    Onychomycosis  Recommend application of ciclopirox to the affected toenails twice a day.  Discussed that resolution of toenail fungus can take months.  She may use the solution up to 1 year.  If no significant improvement in symptoms, follow-up with us and we can place a podiatry referral.  Orders:  •  ciclopirox (LOPROX) 0.77 % SUSP; Apply 1 Application topically 2 (two) times a day    Cardiomyopathy, unspecified type (HCC)  Mild.  Discovered during hospitalization a couple years ago for chest pain.  Followed up with cardiology.  Cardiology note from 2024 reviewed.  EKG at that time was sinus rhythm with sinus arrhythmia.  Cardiology did not have significant concern and extracted patient to report any cardiac symptoms.  Recommend the patient follow-up with us if  she develops chest pain or shortness of breath.           Preventive Screenings:    - Hepatitis C screening: risks/benefits discussed, patient agrees to screening and orders placed   - HIV screening: screening up-to-date   - Cervical cancer screening: screening up-to-date   - Lung cancer screening: screening not indicated     Immunizations:  - Immunizations due: Prevnar 20 and Tdap         History of Present Illness     Adult Annual Physical:  Patient presents for annual physical. Jade Rodriguez presents to the office for to establish care, for annual physical, and with complaint of toenail fungus and pain behind her ears when she eats as any food.  When she eats pineapple or acidly fruits, she will get a sharp sensation behind both ears that lasts for a few minutes, sometimes up to 30 minutes.  She tends to know this will occur and will combat it by sucking an ice cube or massaging behind her ears.  No difficulty breathing, swallowing, no shortness of breath.  She had an episode of sharp chest pain about 2 years ago and went to the hospital and they did an echo, which they told her was slightly abnormal.  She saw cardiologist later on who did not have any significant concern.  She does not follow with cardiology regularly.  She denies any recent chest pains.  .     Diet and Physical Activity:  - Diet/Nutrition: no special diet and well balanced diet.  - Exercise: walking and no formal exercise.    Depression Screening:  - PHQ-2 Score: 0    General Health:  - Sleep: sleeps well.  - Hearing: normal hearing bilateral ears.  - Vision: wears glasses and most recent eye exam < 1 year ago.  - Dental: regular dental visits and brushes teeth twice daily.    Review of Systems   Constitutional:  Negative for chills and fever.   HENT:  Negative for congestion, ear pain and sore throat.    Eyes:  Negative for pain and visual disturbance.   Respiratory:  Negative for cough and shortness of breath.    Cardiovascular:   "Negative for chest pain and palpitations.   Gastrointestinal:  Negative for abdominal pain, constipation, diarrhea and vomiting.   Genitourinary:  Negative for dysuria and hematuria.   Musculoskeletal:  Negative for arthralgias, back pain and myalgias.   Skin:  Negative for color change and rash.        Toenail fungus   Neurological:  Negative for dizziness, seizures, syncope and headaches.   All other systems reviewed and are negative.        Objective   /70 (Patient Position: Sitting, Cuff Size: Large)   Pulse 87   Temp (!) 95.5 °F (35.3 °C) (Tympanic)   Ht 5' 6\" (1.676 m)   Wt 92.9 kg (204 lb 12.8 oz)   SpO2 98%   BMI 33.06 kg/m²     Physical Exam  Vitals and nursing note reviewed.   Constitutional:       General: She is not in acute distress.     Appearance: She is well-developed.   HENT:      Head: Normocephalic and atraumatic.      Right Ear: Tympanic membrane normal.      Left Ear: Tympanic membrane normal.      Nose: Nose normal.      Mouth/Throat:      Mouth: Mucous membranes are moist.      Pharynx: Oropharynx is clear. No oropharyngeal exudate.   Eyes:      Extraocular Movements: Extraocular movements intact.      Conjunctiva/sclera: Conjunctivae normal.   Cardiovascular:      Rate and Rhythm: Normal rate and regular rhythm.      Heart sounds: Normal heart sounds. No murmur heard.     No friction rub. No gallop.   Pulmonary:      Effort: Pulmonary effort is normal. No respiratory distress.      Breath sounds: Normal breath sounds. No wheezing, rhonchi or rales.   Abdominal:      Palpations: Abdomen is soft.      Tenderness: There is no abdominal tenderness.   Musculoskeletal:         General: No swelling.      Cervical back: Neck supple.   Feet:      Right foot:      Toenail Condition: Fungal disease present.     Left foot:      Toenail Condition: Fungal disease present.     Comments: Fungal disease on the first toenails bilaterally in the left fourth toenail.  Skin:     General: Skin is warm " and dry.      Capillary Refill: Capillary refill takes less than 2 seconds.   Neurological:      General: No focal deficit present.      Mental Status: She is alert.   Psychiatric:         Mood and Affect: Mood normal.

## 2025-05-12 NOTE — ASSESSMENT & PLAN NOTE
Mild.  Discovered during hospitalization a couple years ago for chest pain.  Followed up with cardiology.  Cardiology note from 8/5/2024 reviewed.  EKG at that time was sinus rhythm with sinus arrhythmia.  Cardiology did not have significant concern and extracted patient to report any cardiac symptoms.  Recommend the patient follow-up with us if she develops chest pain or shortness of breath.